# Patient Record
Sex: FEMALE | Race: WHITE | NOT HISPANIC OR LATINO
[De-identification: names, ages, dates, MRNs, and addresses within clinical notes are randomized per-mention and may not be internally consistent; named-entity substitution may affect disease eponyms.]

---

## 2017-04-10 PROBLEM — Z00.00 ENCOUNTER FOR PREVENTIVE HEALTH EXAMINATION: Status: ACTIVE | Noted: 2017-04-10

## 2017-05-02 ENCOUNTER — APPOINTMENT (OUTPATIENT)
Dept: OTOLARYNGOLOGY | Facility: CLINIC | Age: 52
End: 2017-05-02

## 2017-05-02 VITALS
WEIGHT: 130 LBS | HEIGHT: 65 IN | DIASTOLIC BLOOD PRESSURE: 84 MMHG | HEART RATE: 66 BPM | SYSTOLIC BLOOD PRESSURE: 127 MMHG | BODY MASS INDEX: 21.66 KG/M2

## 2017-05-02 DIAGNOSIS — Z78.9 OTHER SPECIFIED HEALTH STATUS: ICD-10-CM

## 2017-05-02 DIAGNOSIS — Z87.09 PERSONAL HISTORY OF OTHER DISEASES OF THE RESPIRATORY SYSTEM: ICD-10-CM

## 2017-05-02 DIAGNOSIS — Z83.3 FAMILY HISTORY OF DIABETES MELLITUS: ICD-10-CM

## 2017-05-02 DIAGNOSIS — Z82.49 FAMILY HISTORY OF ISCHEMIC HEART DISEASE AND OTHER DISEASES OF THE CIRCULATORY SYSTEM: ICD-10-CM

## 2017-05-08 PROBLEM — Z87.09 HISTORY OF ASTHMA: Status: RESOLVED | Noted: 2017-05-02 | Resolved: 2017-05-08

## 2017-05-08 PROBLEM — Z82.49 FAMILY HISTORY OF MYOCARDIAL INFARCTION: Status: ACTIVE | Noted: 2017-05-02

## 2017-05-08 PROBLEM — Z83.3 FAMILY HISTORY OF DIABETES MELLITUS: Status: ACTIVE | Noted: 2017-05-02

## 2017-05-08 RX ORDER — ESCITALOPRAM OXALATE 10 MG/1
10 TABLET, FILM COATED ORAL
Refills: 0 | Status: ACTIVE | COMMUNITY

## 2017-06-09 ENCOUNTER — APPOINTMENT (OUTPATIENT)
Dept: OTOLARYNGOLOGY | Facility: CLINIC | Age: 52
End: 2017-06-09

## 2017-06-09 ENCOUNTER — LABORATORY RESULT (OUTPATIENT)
Age: 52
End: 2017-06-09

## 2017-06-09 VITALS
HEIGHT: 65 IN | SYSTOLIC BLOOD PRESSURE: 113 MMHG | WEIGHT: 130 LBS | DIASTOLIC BLOOD PRESSURE: 79 MMHG | BODY MASS INDEX: 21.66 KG/M2 | HEART RATE: 64 BPM

## 2017-06-30 ENCOUNTER — APPOINTMENT (OUTPATIENT)
Dept: OTOLARYNGOLOGY | Facility: CLINIC | Age: 52
End: 2017-06-30

## 2017-06-30 VITALS
HEIGHT: 65 IN | SYSTOLIC BLOOD PRESSURE: 100 MMHG | HEART RATE: 87 BPM | BODY MASS INDEX: 21.66 KG/M2 | WEIGHT: 130 LBS | DIASTOLIC BLOOD PRESSURE: 73 MMHG

## 2017-06-30 DIAGNOSIS — R09.82 POSTNASAL DRIP: ICD-10-CM

## 2017-08-01 ENCOUNTER — APPOINTMENT (OUTPATIENT)
Dept: OTOLARYNGOLOGY | Facility: CLINIC | Age: 52
End: 2017-08-01

## 2017-08-29 ENCOUNTER — APPOINTMENT (OUTPATIENT)
Dept: OTOLARYNGOLOGY | Facility: CLINIC | Age: 52
End: 2017-08-29

## 2018-07-27 PROBLEM — Z78.9 ALCOHOL USE: Status: ACTIVE | Noted: 2017-05-02

## 2019-03-18 ENCOUNTER — APPOINTMENT (OUTPATIENT)
Dept: OTOLARYNGOLOGY | Facility: CLINIC | Age: 54
End: 2019-03-18
Payer: MEDICAID

## 2019-03-18 VITALS
DIASTOLIC BLOOD PRESSURE: 63 MMHG | SYSTOLIC BLOOD PRESSURE: 103 MMHG | HEART RATE: 60 BPM | WEIGHT: 130 LBS | BODY MASS INDEX: 21.66 KG/M2 | HEIGHT: 65 IN

## 2019-03-18 PROCEDURE — 99214 OFFICE O/P EST MOD 30 MIN: CPT | Mod: 25

## 2019-03-18 PROCEDURE — 31231 NASAL ENDOSCOPY DX: CPT

## 2019-03-18 NOTE — DATA REVIEWED
[de-identified] : CT scan 4/28/17:\par mucosal thickening within b/l L>R frontal sinuses w obstruction of left outflow tract. s/p b/l sphenoethmoidectomies w thickening of L>R maxillary mucosa and remnant ethmoid cells along the orbit.\par This study was markedly improved from CT 4/4/17 which essentially showed pansinusitis and opacified sinuses; most recent scan shows some mucosal thickening and edema, but aerated sinuses.\par

## 2019-03-18 NOTE — PROCEDURE
[FreeTextEntry3] : Nasal Endoscopy:\par diffuse sinonasal mucosal boggy edema and copious thick clear/white mucus suctioned\par R NC: s/p subtotal middle turbinectomy w nubbin of edematous middle turbinate sitting adjacent to posterior fontanelle w thick mucus suctioned; polypoid edematous mucosa obstructing the middle meatus and all other paranasal sinuses\par L NC: middle turbinate medialized and middle meatus obstructed by severe polyposis. posterior fontanelle patent and suctioned of thick mucoid debris/mucopus\par

## 2019-03-18 NOTE — PHYSICAL EXAM
[Nasal Endoscopy Performed] : nasal endoscopy was performed, see procedure section for findings [Midline] : trachea located in midline position [Normal] : orientation to person, place, and time: normal

## 2019-03-18 NOTE — HISTORY OF PRESENT ILLNESS
[de-identified] : 54F here in followup.\par \par She was last seen 6/2017.\par Since last visit, she has done incredibly well and has only needed oral steroids once in over 18 months.\par Shortly after last visit, she was started on Fasenra and her asthma has been very well controlled. Breathing has been strong and without wheezing. Since she was doing so well, she stopped the qnasl, singulair and immunotherapy.\par However, over the past 3 months or so, she developed fatigue and exhaustion, thick green nasal mucus and mild facial pressure. These sx persisted for a few weeks and was given cefdinir which did not help. CT sinus done after abx showed severe pansinusitis. Thereafter, she was given 5days prednisone with augmentin; she has now finished the steroids and is prison through the abx and is here in followup. While on the prednisone, she immediately felt an improvement in her sx.\par As baseline, she has chronic postnasal drip, itchy/watery eyes and nose, sneezing.\par \par She has h/o chronic sinusitis with polyposis with frequent exacerbations requiring multiple courses of abx/steroids yearly.\par She had 2 prior ESS (2014, 2015). She did well ~1yr since last surgery, but sx then restarted - dry cough, wheezing, with occasional forehead pressure and drainage. \par Has lots of allergies (dogs, cats, dust).\par \par CT Sinus 3/8/19:\par near complete pansinus opacification\par \par ROS otherwise unchanged.

## 2019-03-18 NOTE — CONSULT LETTER
[Dear  ___] : Dear  [unfilled], [Courtesy Letter:] : I had the pleasure of seeing your patient, [unfilled], in my office today. [Consult Closing:] : Thank you very much for allowing me to participate in the care of this patient.  If you have any questions, please do not hesitate to contact me. [Sincerely,] : Sincerely, [Benji Mixon MD] : Benji Mixon MD  [DrJose Cruz  ___] : Dr. MEJIAS [Department of Otolaryngology, Head and Neck Surgery] : Department of Otolaryngology, Head and Neck Surgery [[facility]] : [facility]

## 2019-03-18 NOTE — ASSESSMENT
[FreeTextEntry1] : 54F here in followup. Since last visit 6/2017, she has done incredibly well and has only needed oral steroids once in over 18 months. Shortly after last visit, she was started on Fasenra and her asthma has been very well controlled. Breathing has been strong and without wheezing. Since she was doing so well, she stopped the singulair and immunotherapy; she stopped qnasl since insurance does not cover anymore. However, over the past 3 months or so, she developed fatigue and exhaustion, thick green nasal mucus and mild facial pressure. These sx persisted for a few weeks and she was given cefdinir which did not help. CT sinus done after abx showed severe pansinusitis with near complete pansinus opacification. Thereafter, she was given 5days prednisone with augmentin; she has now finished the steroids and is [facility] through the abx and is here in followup. While on the prednisone, she immediately felt an improvement in her sx, but sx persist. As baseline, she has chronic postnasal drip, itchy/watery eyes and nose, sneezing.\par She has h/o chronic sinusitis with polyposis and prior to Fasenra, her asthma was brittle with frequent sinusitis exacerbations requiring multiple courses of abx/steroids yearly.\par She had 2 prior ESS (2014, 2015). She did well ~1yr since last surgery, but sx then restarted - dry cough, wheezing, with occasional forehead pressure and drainage. Has lots of allergies (dogs, cats, dust).\par On exam, nasal endoscopy shows paranasal sinus obstruction due to polypoid edema and polyps with copious thick mucus.\par Although her asthma has improved remarkably on the Fasenra, her polyps have returned and remain symptomatic despite two courses abx and prednisone. Endoscopy shows a large polyp burden which correlates with the CT showing near complete pansinus opacification.\par At this point, would recommend finishing the current round of abx and getting CT after; she RTO thereafter to review. Should CT and endoscopy and sx not improve by much, I would recommend revision sinus surgery to reestablish middle meatal/paranasal sinus patency and maximize efficiency of steroid irrigations. This was all discussed at length and all questions answered.

## 2019-04-05 ENCOUNTER — APPOINTMENT (OUTPATIENT)
Dept: OTOLARYNGOLOGY | Facility: CLINIC | Age: 54
End: 2019-04-05
Payer: MEDICAID

## 2019-04-05 VITALS
SYSTOLIC BLOOD PRESSURE: 114 MMHG | WEIGHT: 130 LBS | HEART RATE: 72 BPM | DIASTOLIC BLOOD PRESSURE: 68 MMHG | HEIGHT: 65 IN | BODY MASS INDEX: 21.66 KG/M2

## 2019-04-05 PROCEDURE — 31231 NASAL ENDOSCOPY DX: CPT

## 2019-04-05 PROCEDURE — 99214 OFFICE O/P EST MOD 30 MIN: CPT | Mod: 25

## 2019-04-07 NOTE — CONSULT LETTER
[Dear  ___] : Dear  [unfilled], [Courtesy Letter:] : I had the pleasure of seeing your patient, [unfilled], in my office today. [Consult Closing:] : Thank you very much for allowing me to participate in the care of this patient.  If you have any questions, please do not hesitate to contact me. [Sincerely,] : Sincerely, [DrJose Cruz  ___] : Dr. MEJIAS [Benji Mixon MD] : Benji Mixon MD  [Department of Otolaryngology, Head and Neck Surgery] : Department of Otolaryngology, Head and Neck Surgery [[facility]] : [facility]

## 2019-04-07 NOTE — DATA REVIEWED
[de-identified] : CT scan 4/28/17:\par mucosal thickening within b/l L>R frontal sinuses w obstruction of left outflow tract. s/p b/l sphenoethmoidectomies w thickening of L>R maxillary mucosa and remnant ethmoid cells along the orbit.\par This study was markedly improved from CT 4/4/17 which essentially showed pansinusitis and opacified sinuses; most recent scan shows some mucosal thickening and edema, but aerated sinuses.\par

## 2019-04-07 NOTE — HISTORY OF PRESENT ILLNESS
[de-identified] : 54F here in followup.\par \par Since last seen 3 weeks ago, she has finished the course of antibiotic and additional steroids. However, she still feels fatigued with diminished energy levels.  The thick green nasal mucus and mild facial pressure have mostly resolved.\par Repeat CT sinus 3/27/19 is improved, but still shows moderately severe bilateral maxillary and frontal disease with near complete opacification of the left side. Initial CT 3/6/19 shows complete pansinus opacification.\par She still has additional steroids which she has not taken due to miscommunication.\par \par Prior to last visit 3 weeks ago, she had done incredibly well over the past 18 months. During this time, she was started on Fasenra and her asthma has been very well controlled. Breathing has been strong and without wheezing. Since she was doing so well, she stopped the qnasl, singulair and immunotherapy.\par \par As baseline, she has chronic postnasal drip, itchy/watery eyes and nose, sneezing.\par \par She has h/o chronic sinusitis with polyposis.\par She had 2 prior ESS (2014, 2015). \par Has lots of allergies (dogs, cats, dust).\par \par ROS otherwise unchanged.

## 2019-04-07 NOTE — ASSESSMENT
[FreeTextEntry1] : 54F here in followup. Since last seen 3 weeks ago, she has finished the course of antibiotic and additional steroids and the thick green nasal mucus and mild facial pressure have mostly resolved.. However, she still feels fatigued with diminished energy levels. Repeat CT sinus 3/27/19 is improved, but still shows moderately severe bilateral maxillary and frontal disease with near complete opacification of the left side. Initial CT 3/6/19 had shown complete pansinus opacification.She still has additional steroids which she has not taken due to miscommunication.\par Prior to last visit 3 weeks ago, she had done incredibly well over the past 18 months. During this time, she was started on Fasenra and her asthma has been very well controlled. Breathing has been strong and without wheezing. Since she was doing so well, she stopped the qnasl, singulair and immunotherapy.\par She has h/o chronic sinusitis with polyposis and prior to Fasenra, her asthma was brittle with frequent sinusitis exacerbations requiring multiple courses of abx/steroids yearly. She had 2 prior ESS (2014, 2015). Has lots of allergies (dogs, cats, dust).\par On exam, nasal endoscopy is improved but still shows mucosal edema and paranasal sinus obstruction due to polypoid edema and polyps.\par Although her asthma has improved remarkably on the Fasenra, her polyps have returned and she remains symptomatic despite several courses abx and prednisone. Endoscopy today is improved, but still shows a moderate polyp burden which correlates with the repeat CT. No doubt she would benefit from revision sinus surgery, but she is improving slowly after most recent steroid course and has also done remarkably well on the Fasenra. Most importantly, she is not yet ready for a third surgery at this point.\par At this point, she will finish the remaining oral steroid taper and RTO thereafter. Should she remain symptomatic then, I would recommend revision sinus surgery to reestablish middle meatal/paranasal sinus patency and maximize efficiency of steroid irrigations. This was all discussed at length and all questions answered.\par RTO 3-4 weeks.

## 2019-05-03 ENCOUNTER — APPOINTMENT (OUTPATIENT)
Dept: OTOLARYNGOLOGY | Facility: CLINIC | Age: 54
End: 2019-05-03

## 2020-10-30 ENCOUNTER — RESULT REVIEW (OUTPATIENT)
Age: 55
End: 2020-10-30

## 2023-10-19 ENCOUNTER — APPOINTMENT (OUTPATIENT)
Dept: OTOLARYNGOLOGY | Facility: CLINIC | Age: 58
End: 2023-10-19
Payer: MEDICAID

## 2023-10-19 VITALS — BODY MASS INDEX: 22.49 KG/M2 | WEIGHT: 135 LBS | HEIGHT: 65 IN

## 2023-10-19 PROCEDURE — 31231 NASAL ENDOSCOPY DX: CPT

## 2023-10-19 PROCEDURE — 99213 OFFICE O/P EST LOW 20 MIN: CPT | Mod: 25

## 2023-10-23 ENCOUNTER — TRANSCRIPTION ENCOUNTER (OUTPATIENT)
Age: 58
End: 2023-10-23

## 2023-12-07 ENCOUNTER — APPOINTMENT (OUTPATIENT)
Dept: PULMONOLOGY | Facility: CLINIC | Age: 58
End: 2023-12-07
Payer: MEDICAID

## 2023-12-07 ENCOUNTER — LABORATORY RESULT (OUTPATIENT)
Age: 58
End: 2023-12-07

## 2023-12-07 VITALS
HEIGHT: 65 IN | WEIGHT: 137 LBS | TEMPERATURE: 97.9 F | HEART RATE: 77 BPM | DIASTOLIC BLOOD PRESSURE: 76 MMHG | OXYGEN SATURATION: 97 % | SYSTOLIC BLOOD PRESSURE: 119 MMHG | BODY MASS INDEX: 22.82 KG/M2

## 2023-12-07 DIAGNOSIS — Z23 ENCOUNTER FOR IMMUNIZATION: ICD-10-CM

## 2023-12-07 PROCEDURE — 94010 BREATHING CAPACITY TEST: CPT | Mod: GC

## 2023-12-07 PROCEDURE — 99204 OFFICE O/P NEW MOD 45 MIN: CPT | Mod: 25,GC

## 2023-12-07 PROCEDURE — 95012 NITRIC OXIDE EXP GAS DETER: CPT | Mod: GC

## 2023-12-07 PROCEDURE — 90677 PCV20 VACCINE IM: CPT

## 2023-12-07 PROCEDURE — G0009: CPT

## 2023-12-07 RX ORDER — PREDNISONE 50 MG/1
50 TABLET ORAL DAILY
Qty: 6 | Refills: 0 | Status: DISCONTINUED | COMMUNITY
Start: 2017-06-09 | End: 2023-12-07

## 2023-12-07 RX ORDER — PREDNISONE 10 MG/1
10 TABLET ORAL
Qty: 28 | Refills: 0 | Status: DISCONTINUED | COMMUNITY
Start: 2023-10-19 | End: 2023-12-07

## 2023-12-07 RX ORDER — CEFDINIR 300 MG/1
300 CAPSULE ORAL
Qty: 14 | Refills: 0 | Status: DISCONTINUED | COMMUNITY
Start: 2016-12-15 | End: 2023-12-07

## 2023-12-07 RX ORDER — SULFAMETHOXAZOLE AND TRIMETHOPRIM 800; 160 MG/1; MG/1
800-160 TABLET ORAL TWICE DAILY
Qty: 20 | Refills: 0 | Status: DISCONTINUED | COMMUNITY
Start: 2017-06-09 | End: 2023-12-07

## 2023-12-07 RX ORDER — PREDNISONE 20 MG/1
20 TABLET ORAL
Qty: 40 | Refills: 0 | Status: DISCONTINUED | COMMUNITY
Start: 2017-04-04 | End: 2023-12-07

## 2023-12-07 RX ORDER — AMOXICILLIN AND CLAVULANATE POTASSIUM 875; 125 MG/1; MG/1
875-125 TABLET, COATED ORAL
Qty: 20 | Refills: 0 | Status: DISCONTINUED | COMMUNITY
Start: 2017-03-24 | End: 2023-12-07

## 2023-12-19 ENCOUNTER — LABORATORY RESULT (OUTPATIENT)
Age: 58
End: 2023-12-19

## 2023-12-22 LAB
BASOPHILS # BLD AUTO: 0.09 K/UL
BASOPHILS NFR BLD AUTO: 0.5 %
EOSINOPHIL # BLD AUTO: 10.09 K/UL
EOSINOPHIL NFR BLD AUTO: 54.7 %
HCT VFR BLD CALC: 35.2 %
HGB BLD-MCNC: 11.3 G/DL
IMM GRANULOCYTES NFR BLD AUTO: 0.2 %
LYMPHOCYTES # BLD AUTO: 2.37 K/UL
LYMPHOCYTES NFR BLD AUTO: 12.9 %
MAN DIFF?: NORMAL
MCHC RBC-ENTMCNC: 32.1 GM/DL
MCHC RBC-ENTMCNC: 32.3 PG
MCV RBC AUTO: 100.6 FL
MONOCYTES # BLD AUTO: 0.7 K/UL
MONOCYTES NFR BLD AUTO: 3.8 %
NEUTROPHILS # BLD AUTO: 5.14 K/UL
NEUTROPHILS NFR BLD AUTO: 27.9 %
PLATELET # BLD AUTO: 345 K/UL
RBC # BLD: 3.5 M/UL
RBC # FLD: 14 %
WBC # FLD AUTO: 18.43 K/UL

## 2023-12-22 NOTE — REVIEW OF SYSTEMS
[Fever] : no fever [Chills] : no chills [Dry Eyes] : no dry eyes [Eye Irritation] : no eye irritation [Sputum] : no sputum [Dyspnea] : no dyspnea [SOB on Exertion] : no sob on exertion [Chest Discomfort] : no chest discomfort [Orthopnea] : no orthopnea [GERD] : no gerd

## 2023-12-22 NOTE — ASSESSMENT
[FreeTextEntry1] : Data reviewed:  Dixon 12/07/2023 : normal / FENO 43 (albuterol prior)  Impression: Chronic sinusitis History of asthma, not in exacerbation  ARISCAT score 3, low risk for periop pulmonary complications.  Plan: - PCV 20 today - counseled on trelegy adherence, continue trelegy in periop period - obtain basic labs BMP CBC, IgE - no pulmonary contraindications to sinus surgery - follow up after surgery for full PFTs, advised to avoid albuterol prior to PFT, may be able to deescalate trelegy at next visit -- Attending Addendum  Seen and examined by me and agree w above. Stated hx asthma since childhood, severe sinus disease, in no exacerbation today on Dupixent and not on her controllers, but with frequent need for albuterol. Counseled her to get back on Trelegy daily through surgical period, and then follow up with us for full lung function testing and deescalation of therapy as appropriate. [id number] given, labs sent. -- Eos 7700 - spoke to pt, repeat CBC/diff and get ANCA, CT chest, query EGPA. Will help her get gen med clearance for surgery. -- Eos 10k/ul, p-ANCA and c-ANCA neg - I did the PA for the CT and pls see Dr Verde to exclude primary heme disorder

## 2023-12-28 ENCOUNTER — OUTPATIENT (OUTPATIENT)
Dept: OUTPATIENT SERVICES | Facility: HOSPITAL | Age: 58
LOS: 1 days | End: 2023-12-28
Payer: MEDICAID

## 2023-12-28 ENCOUNTER — RESULT REVIEW (OUTPATIENT)
Age: 58
End: 2023-12-28

## 2023-12-28 ENCOUNTER — APPOINTMENT (OUTPATIENT)
Dept: CT IMAGING | Facility: HOSPITAL | Age: 58
End: 2023-12-28

## 2023-12-28 PROCEDURE — 71250 CT THORAX DX C-: CPT | Mod: 26

## 2023-12-28 PROCEDURE — 71250 CT THORAX DX C-: CPT

## 2024-01-04 ENCOUNTER — APPOINTMENT (OUTPATIENT)
Dept: INTERNAL MEDICINE | Facility: CLINIC | Age: 59
End: 2024-01-04
Payer: MEDICAID

## 2024-01-04 VITALS
HEART RATE: 89 BPM | OXYGEN SATURATION: 97 % | DIASTOLIC BLOOD PRESSURE: 71 MMHG | WEIGHT: 129 LBS | TEMPERATURE: 97.1 F | SYSTOLIC BLOOD PRESSURE: 108 MMHG | HEIGHT: 65 IN | BODY MASS INDEX: 21.49 KG/M2

## 2024-01-04 PROCEDURE — 36415 COLL VENOUS BLD VENIPUNCTURE: CPT

## 2024-01-04 PROCEDURE — 99213 OFFICE O/P EST LOW 20 MIN: CPT | Mod: 25

## 2024-01-04 PROCEDURE — 93000 ELECTROCARDIOGRAM COMPLETE: CPT

## 2024-01-08 ENCOUNTER — LABORATORY RESULT (OUTPATIENT)
Age: 59
End: 2024-01-08

## 2024-01-08 ENCOUNTER — APPOINTMENT (OUTPATIENT)
Dept: HEMATOLOGY ONCOLOGY | Facility: CLINIC | Age: 59
End: 2024-01-08
Payer: MEDICAID

## 2024-01-08 VITALS
SYSTOLIC BLOOD PRESSURE: 103 MMHG | HEIGHT: 65 IN | TEMPERATURE: 98.2 F | DIASTOLIC BLOOD PRESSURE: 68 MMHG | RESPIRATION RATE: 18 BRPM | WEIGHT: 130 LBS | OXYGEN SATURATION: 96 % | HEART RATE: 83 BPM | BODY MASS INDEX: 21.66 KG/M2

## 2024-01-08 PROCEDURE — 99204 OFFICE O/P NEW MOD 45 MIN: CPT

## 2024-01-08 NOTE — ASSESSMENT
[FreeTextEntry1] : 58-year-old female with history of asthma and nasal polyps here for initial evaluation of incidentally found eosinophilia.  All patients with AEC 1500/microL should have a CBC repeated in one to two weeks to determine if the eosinophilia is transient, stable, or rising; the CBC should be repeated even when eosinophilia is detected incidentally in an asymptomatic patient. Persistent AEC >1500/microL or a rising AEC should be evaluated promptly for HES, even though it is uncommon for such patients to be completely asymptomatic.   Work up: CBC with diff CMP HTLV I, HTLV II Hepatitis HIV Serum tryptase (most consistently elevated in myeloproliferative variants [M-HES], variable in other subtypes) Serum vitamin B12 (elevated in M-HES) Serum immunoglobulins (particularly immunoglobulin E [IgE])  Flow cytometry PDGFRA & PDGFRB  RTC on 1/22/24 via

## 2024-01-08 NOTE — HISTORY OF PRESENT ILLNESS
[de-identified] : Nati Shipley is a 58-year-old female who presents to the clinic for initial consultation of eosinophilia.  Heme hx: 12/2023: Has been having night sweats and chills. She has been recently thinking here for a long time of her mother who just passed away.  PMH: asthma, nasal polyps (s/p 2 surgeries) PSH: Nasal polypectomy Allergies: Anaphylaxis to Avelox SH: none  Recently started on Trelegy.     Oriented - self; Oriented - place; Oriented - time

## 2024-01-09 ENCOUNTER — APPOINTMENT (OUTPATIENT)
Dept: INTERNAL MEDICINE | Facility: CLINIC | Age: 59
End: 2024-01-09
Payer: MEDICAID

## 2024-01-09 LAB
APTT BLD: 32.7 SEC
CHOLEST SERPL-MCNC: 135 MG/DL
ESTIMATED AVERAGE GLUCOSE: 123 MG/DL
HBA1C MFR BLD HPLC: 5.9 %
HDLC SERPL-MCNC: 34 MG/DL
INR PPP: 1.05 RATIO
LDLC SERPL CALC-MCNC: 85 MG/DL
NONHDLC SERPL-MCNC: 101 MG/DL
PT BLD: 11.9 SEC
TRIGL SERPL-MCNC: 78 MG/DL
TSH SERPL-ACNC: 2.81 UIU/ML

## 2024-01-09 PROCEDURE — 99214 OFFICE O/P EST MOD 30 MIN: CPT

## 2024-01-09 RX ORDER — BECLOMETHASONE DIPROPIONATE 80 UG/1
80 AEROSOL, METERED RESPIRATORY (INHALATION)
Qty: 9 | Refills: 0 | Status: DISCONTINUED | COMMUNITY
Start: 2017-01-24 | End: 2024-01-09

## 2024-01-09 RX ORDER — ESCITALOPRAM OXALATE 20 MG/1
20 TABLET ORAL
Qty: 30 | Refills: 0 | Status: DISCONTINUED | COMMUNITY
Start: 2015-12-28 | End: 2024-01-09

## 2024-01-09 RX ORDER — BECLOMETHASONE DIPROPIONATE 80 UG/1
AEROSOL, METERED NASAL
Refills: 0 | Status: DISCONTINUED | COMMUNITY
End: 2024-01-09

## 2024-01-09 RX ORDER — BUPROPION HYDROCHLORIDE 200 MG/1
200 TABLET, FILM COATED ORAL
Refills: 0 | Status: DISCONTINUED | COMMUNITY
End: 2024-01-09

## 2024-01-09 RX ORDER — MONTELUKAST 10 MG/1
10 TABLET, FILM COATED ORAL DAILY
Qty: 30 | Refills: 3 | Status: ACTIVE | COMMUNITY
Start: 1900-01-01 | End: 1900-01-01

## 2024-01-09 RX ORDER — FLUTICASONE PROPIONATE 50 UG/1
50 SPRAY, METERED NASAL
Qty: 16 | Refills: 0 | Status: DISCONTINUED | COMMUNITY
Start: 2017-01-31 | End: 2024-01-09

## 2024-01-10 LAB
ALBUMIN SERPL ELPH-MCNC: 3.2 G/DL
ALP BLD-CCNC: 119 U/L
ALT SERPL-CCNC: 17 U/L
ANION GAP SERPL CALC-SCNC: 4 MMOL/L
AST SERPL-CCNC: 22 U/L
BILIRUB SERPL-MCNC: 0.5 MG/DL
BUN SERPL-MCNC: 7 MG/DL
CALCIUM SERPL-MCNC: 9.9 MG/DL
CHLORIDE SERPL-SCNC: 104 MMOL/L
CO2 SERPL-SCNC: 31 MMOL/L
CREAT SERPL-MCNC: 0.6 MG/DL
EGFR: 103 ML/MIN/1.73M2
FOLATE SERPL-MCNC: 5 NG/ML
GLUCOSE SERPL-MCNC: 103 MG/DL
HBV CORE IGG+IGM SER QL: NONREACTIVE
HBV SURFACE AB SER QL: NONREACTIVE
HBV SURFACE AG SER QL: NONREACTIVE
HCV AB SER QL: NONREACTIVE
HCV S/CO RATIO: 0.03 S/CO
HIV1+2 AB SPEC QL IA.RAPID: NONREACTIVE
POTASSIUM SERPL-SCNC: 4.6 MMOL/L
PROT SERPL-MCNC: 8.3 G/DL
SODIUM SERPL-SCNC: 139 MMOL/L
TRYPTASE: 2.6 UG/L
VIT B12 SERPL-MCNC: 540 PG/ML

## 2024-01-12 ENCOUNTER — LABORATORY RESULT (OUTPATIENT)
Age: 59
End: 2024-01-12

## 2024-01-12 ENCOUNTER — APPOINTMENT (OUTPATIENT)
Dept: PULMONOLOGY | Facility: CLINIC | Age: 59
End: 2024-01-12
Payer: MEDICAID

## 2024-01-12 VITALS
HEART RATE: 86 BPM | OXYGEN SATURATION: 97 % | BODY MASS INDEX: 20.83 KG/M2 | DIASTOLIC BLOOD PRESSURE: 67 MMHG | WEIGHT: 125 LBS | TEMPERATURE: 97.6 F | SYSTOLIC BLOOD PRESSURE: 90 MMHG | HEIGHT: 65 IN

## 2024-01-12 DIAGNOSIS — R31.29 OTHER MICROSCOPIC HEMATURIA: ICD-10-CM

## 2024-01-12 LAB
APPEARANCE: CLEAR
APTT BLD: 33.3 SEC
BILIRUBIN URINE: NEGATIVE
BLOOD URINE: NEGATIVE
COLOR: YELLOW
GLUCOSE QUALITATIVE U: NEGATIVE MG/DL
HTLV I+II AB SER QL: NORMAL
INR PPP: 1.14
KETONES URINE: ABNORMAL MG/DL
LEUKOCYTE ESTERASE URINE: ABNORMAL
NITRITE URINE: NEGATIVE
NT-PROBNP SERPL-MCNC: 85 PG/ML
PH URINE: 6.5
PROTEIN URINE: NORMAL MG/DL
PT BLD: 13 SEC
SPECIFIC GRAVITY URINE: 1.02
UROBILINOGEN URINE: 1 MG/DL

## 2024-01-12 PROCEDURE — 99214 OFFICE O/P EST MOD 30 MIN: CPT | Mod: 25

## 2024-01-12 PROCEDURE — 36415 COLL VENOUS BLD VENIPUNCTURE: CPT

## 2024-01-16 ENCOUNTER — LABORATORY RESULT (OUTPATIENT)
Age: 59
End: 2024-01-16

## 2024-01-16 ENCOUNTER — NON-APPOINTMENT (OUTPATIENT)
Age: 59
End: 2024-01-16

## 2024-01-16 LAB — TOTAL IGE SMQN RAST: 189 KU/L

## 2024-01-17 ENCOUNTER — APPOINTMENT (OUTPATIENT)
Dept: HEMATOLOGY ONCOLOGY | Facility: CLINIC | Age: 59
End: 2024-01-17

## 2024-01-17 ENCOUNTER — LABORATORY RESULT (OUTPATIENT)
Age: 59
End: 2024-01-17

## 2024-01-17 LAB — LDH SERPL-CCNC: <10 U/L

## 2024-01-17 NOTE — ASSESSMENT
[FreeTextEntry1] : 58F PMHx chronic sinusitis, s/p ESS 2014 and 2015, asthma, presenting for follow up from preop visit to be seen by supervising attending; discussed hematological lab abnormalities.

## 2024-01-17 NOTE — HISTORY OF PRESENT ILLNESS
[FreeTextEntry1] : follow up  [de-identified] : 58F PMHx chronic sinusitis, s/p ESS 2014 and 2015, asthma, presenting for follow up. Patient was seen 1/4 for pre-op visit for planned endoscopic sinus surgery on 1/29/24. Reviewed labs with patient and noted persistently elevated WBC and eosinophils and decreasing Hgb. Patient notes having nightly chills, sweats, poor appetite, recurring HA, and roughly ~10lbs weight loss since 12/23. Denies any fatigue, fevers, cough, vision changes, CP, SOB, n/v/d, melena,  sxs, skin change, joint pain. No sick contacts. Last traveled to [place], [place] in Sept for 1 week; no acute illnesses at the time. UTD with routine cancer screenings. Patient was seen by Laura yesterday and is being worked up for eosinophilia.   Cleared medically for ESS on 1/29/24.

## 2024-01-17 NOTE — REVIEW OF SYSTEMS
[Chills] : chills [Night Sweats] : night sweats [Recent Change In Weight] : ~T recent weight change [Fever] : no fever [Fatigue] : no fatigue [Hot Flashes] : no hot flashes [Discharge] : no discharge [Vision Problems] : no vision problems [Earache] : no earache [Sore Throat] : no sore throat [Chest Pain] : no chest pain [Shortness Of Breath] : no shortness of breath [Wheezing] : no wheezing [Cough] : no cough [Dyspnea on Exertion] : no dyspnea on exertion [Abdominal Pain] : no abdominal pain [Nausea] : no nausea [Constipation] : no constipation [Diarrhea] : diarrhea [Vomiting] : no vomiting [Melena] : no melena [Dysuria] : no dysuria [Hematuria] : no hematuria [Frequency] : no frequency [Joint Pain] : no joint pain [Muscle Weakness] : no muscle weakness [Muscle Pain] : no muscle pain [Itching] : no itching [Skin Rash] : no skin rash [Headache] : no headache [Dizziness] : no dizziness [Insomnia] : no insomnia [Easy Bruising] : no easy bruising

## 2024-01-17 NOTE — PHYSICAL EXAM
[No Acute Distress] : no acute distress [Well Nourished] : well nourished [PERRL] : pupils equal round and reactive to light [EOMI] : extraocular movements intact [Normal Outer Ear/Nose] : the outer ears and nose were normal in appearance [No Lymphadenopathy] : no lymphadenopathy [No Accessory Muscle Use] : no accessory muscle use [Clear to Auscultation] : lungs were clear to auscultation bilaterally [Normal Rate] : normal rate  [Normal S1, S2] : normal S1 and S2 [No Edema] : there was no peripheral edema [No Extremity Clubbing/Cyanosis] : no extremity clubbing/cyanosis [Soft] : abdomen soft [Non Tender] : non-tender [Non-distended] : non-distended [No Masses] : no abdominal mass palpated [Normal Bowel Sounds] : normal bowel sounds [No Joint Swelling] : no joint swelling [No Rash] : no rash [No Focal Deficits] : no focal deficits [de-identified] : R inner buccal swelling

## 2024-01-17 NOTE — PLAN
[FreeTextEntry1] : .  #eosinophilia #leukocytosis #anemia #thrombophilia  #B-symptoms - Found to have persistently elevated WBC, eosinophils since 12/7 labs.  - Reports nightly chills, sweats, poor appetite, recurring HA, and roughly ~10lbs weight loss since 12/23 - No personal or FMHx malignancy; UTD with cancer screenings - No s/s anemia; denies epistaxis, melena, hematemesis, bruising  - Physical exam unremarkable, no lymphadenopathy - Possibly medication side effect of Dupixent; no longer taking prednisone  - Was seen by Laura on 1/8; will continue hematological w/u - f/u stool Ova and Parasite   #asthma - c/w Montelukast 10mg qd - c/w Trelegy inh qd   #anxiety  - c/w lexapro 10mg qd   #HCM - UTD with mammo, pap smear - sent referral for C-scope

## 2024-01-18 ENCOUNTER — OUTPATIENT (OUTPATIENT)
Dept: OUTPATIENT SERVICES | Facility: HOSPITAL | Age: 59
LOS: 1 days | End: 2024-01-18
Payer: MEDICAID

## 2024-01-18 DIAGNOSIS — D72.10 EOSINOPHILIA, UNSPECIFIED: ICD-10-CM

## 2024-01-18 PROBLEM — R31.29 MICROSCOPIC HEMATURIA: Status: ACTIVE | Noted: 2024-01-18

## 2024-01-18 LAB
APPEARANCE: CLEAR
BILIRUBIN URINE: NEGATIVE
BLOOD URINE: NEGATIVE
COLOR: YELLOW
CREAT SPEC-SCNC: 120 MG/DL
GLUCOSE QUALITATIVE U: NEGATIVE MG/DL
KETONES URINE: NEGATIVE MG/DL
LEUKOCYTE ESTERASE URINE: ABNORMAL
MICROALBUMIN 24H UR DL<=1MG/L-MCNC: <1.2 MG/DL
MICROALBUMIN/CREAT 24H UR-RTO: NORMAL MG/G
MYELOPEROXIDASE AB SER QL IA: 6.2 UNITS
MYELOPEROXIDASE CELLS FLD QL: NEGATIVE
NITRITE URINE: NEGATIVE
PH URINE: 6.5
PROTEIN URINE: NEGATIVE MG/DL
PROTEINASE3 AB SER IA-ACNC: <5 UNITS
PROTEINASE3 AB SER-ACNC: NEGATIVE
SPECIFIC GRAVITY URINE: 1.02
UROBILINOGEN URINE: 0.2 MG/DL

## 2024-01-18 PROCEDURE — C8929: CPT

## 2024-01-18 PROCEDURE — 93306 TTE W/DOPPLER COMPLETE: CPT | Mod: 26

## 2024-01-18 NOTE — HISTORY OF PRESENT ILLNESS
[de-identified] : Nati Shipley is a 58-year-old female who presents to the clinic for initial consultation of eosinophilia.  Heme hx: 12/2023: Has been having night sweats and chills. She has been recently thinking here for a long time of her mother who just passed away.  PMH: asthma, nasal polyps (s/p 2 surgeries) PSH: Nasal polypectomy Allergies: Anaphylaxis to Avelox SH: none  Recently started on Trelegy.

## 2024-01-19 ENCOUNTER — RESULT REVIEW (OUTPATIENT)
Age: 59
End: 2024-01-19

## 2024-01-22 ENCOUNTER — APPOINTMENT (OUTPATIENT)
Dept: HEMATOLOGY ONCOLOGY | Facility: CLINIC | Age: 59
End: 2024-01-22

## 2024-01-23 LAB — T(9;22)(ABL1,BCR)/CONTROL BLD/T: NORMAL

## 2024-01-24 ENCOUNTER — APPOINTMENT (OUTPATIENT)
Dept: PULMONOLOGY | Facility: CLINIC | Age: 59
End: 2024-01-24

## 2024-01-25 ENCOUNTER — APPOINTMENT (OUTPATIENT)
Dept: HEMATOLOGY ONCOLOGY | Facility: CLINIC | Age: 59
End: 2024-01-25
Payer: MEDICAID

## 2024-01-25 ENCOUNTER — APPOINTMENT (OUTPATIENT)
Dept: HEMATOLOGY ONCOLOGY | Facility: CLINIC | Age: 59
End: 2024-01-25

## 2024-01-25 PROCEDURE — 99212 OFFICE O/P EST SF 10 MIN: CPT | Mod: 95

## 2024-01-26 NOTE — ASU PATIENT PROFILE, ADULT - NSICDXPASTMEDICALHX_GEN_ALL_CORE_FT
PAST MEDICAL HISTORY:  Asthma     H/O chronic sinusitis      PAST MEDICAL HISTORY:  Asthma well comtrolled    H/O chronic sinusitis

## 2024-01-26 NOTE — ASU PATIENT PROFILE, ADULT - VISION (WITH CORRECTIVE LENSES IF THE PATIENT USUALLY WEARS THEM):
contacts/Partially impaired: cannot see medication labels or newsprint, but can see obstacles in path, and the surrounding layout; can count fingers at arm's length contacts left home/Partially impaired: cannot see medication labels or newsprint, but can see obstacles in path, and the surrounding layout; can count fingers at arm's length

## 2024-01-26 NOTE — ASU PATIENT PROFILE, ADULT - FALL HARM RISK - UNIVERSAL INTERVENTIONS
Bed in lowest position, wheels locked, appropriate side rails in place/Call bell, personal items and telephone in reach/Instruct patient to call for assistance before getting out of bed or chair/Non-slip footwear when patient is out of bed/[place] to call system/Physically safe environment - no spills, clutter or unnecessary equipment/Purposeful Proactive Rounding/Room/bathroom lighting operational, light cord in reach

## 2024-01-28 ENCOUNTER — TRANSCRIPTION ENCOUNTER (OUTPATIENT)
Age: 59
End: 2024-01-28

## 2024-01-29 ENCOUNTER — RESULT REVIEW (OUTPATIENT)
Age: 59
End: 2024-01-29

## 2024-01-29 ENCOUNTER — OUTPATIENT (OUTPATIENT)
Dept: OUTPATIENT SERVICES | Facility: HOSPITAL | Age: 59
LOS: 1 days | Discharge: HOME | End: 2024-01-29
Payer: MEDICAID

## 2024-01-29 ENCOUNTER — APPOINTMENT (OUTPATIENT)
Dept: OTOLARYNGOLOGY | Facility: HOSPITAL | Age: 59
End: 2024-01-29

## 2024-01-29 ENCOUNTER — TRANSCRIPTION ENCOUNTER (OUTPATIENT)
Age: 59
End: 2024-01-29

## 2024-01-29 VITALS
TEMPERATURE: 99 F | OXYGEN SATURATION: 96 % | HEART RATE: 70 BPM | DIASTOLIC BLOOD PRESSURE: 56 MMHG | SYSTOLIC BLOOD PRESSURE: 100 MMHG | RESPIRATION RATE: 16 BRPM

## 2024-01-29 VITALS
SYSTOLIC BLOOD PRESSURE: 93 MMHG | HEART RATE: 55 BPM | OXYGEN SATURATION: 99 % | TEMPERATURE: 99 F | RESPIRATION RATE: 14 BRPM | DIASTOLIC BLOOD PRESSURE: 60 MMHG | HEIGHT: 65 IN | WEIGHT: 125.22 LBS

## 2024-01-29 DIAGNOSIS — Z98.890 OTHER SPECIFIED POSTPROCEDURAL STATES: Chronic | ICD-10-CM

## 2024-01-29 LAB
GRAM STN FLD: ABNORMAL
SPECIMEN SOURCE: SIGNIFICANT CHANGE UP

## 2024-01-29 PROCEDURE — 30130 EXCISE INFERIOR TURBINATE: CPT | Mod: 50

## 2024-01-29 PROCEDURE — 88311 DECALCIFY TISSUE: CPT | Mod: 26

## 2024-01-29 PROCEDURE — 61782 SCAN PROC CRANIAL EXTRA: CPT

## 2024-01-29 PROCEDURE — 88304 TISSUE EXAM BY PATHOLOGIST: CPT | Mod: 26

## 2024-01-29 PROCEDURE — 61580 CRANIOFACIAL APPROACH SKULL: CPT

## 2024-01-29 PROCEDURE — 31267 ENDOSCOPY MAXILLARY SINUS: CPT | Mod: 50

## 2024-01-29 PROCEDURE — 88313 SPECIAL STAINS GROUP 2: CPT | Mod: 26

## 2024-01-29 PROCEDURE — 31259 NSL/SINS NDSC SPHN TISS RMVL: CPT | Mod: 50

## 2024-01-29 PROCEDURE — 88305 TISSUE EXAM BY PATHOLOGIST: CPT | Mod: 26

## 2024-01-29 PROCEDURE — 15769 GRFG AUTOL SOFT TISS DIR EXC: CPT

## 2024-01-29 DEVICE — SURGIFOAM PAD 2CM X 6CM X 7MM (12-7): Type: IMPLANTABLE DEVICE | Status: FUNCTIONAL

## 2024-01-29 DEVICE — STENT DRUG ELUTING SINUS BIO ABSORB INTERSECT ENT PROPEL 23MM: Type: IMPLANTABLE DEVICE | Status: FUNCTIONAL

## 2024-01-29 DEVICE — SHT XOMED 0.010X025: Type: IMPLANTABLE DEVICE | Status: FUNCTIONAL

## 2024-01-29 DEVICE — STENT SINUS DRUG ELUDING PROPEL CONTOUR: Type: IMPLANTABLE DEVICE | Status: FUNCTIONAL

## 2024-01-29 RX ORDER — ACETAMINOPHEN 500 MG
1000 TABLET ORAL ONCE
Refills: 0 | Status: DISCONTINUED | OUTPATIENT
Start: 2024-01-29 | End: 2024-01-29

## 2024-01-29 RX ORDER — FLUTICASONE FUROATE, UMECLIDINIUM BROMIDE AND VILANTEROL TRIFENATATE 200; 62.5; 25 UG/1; UG/1; UG/1
1 POWDER RESPIRATORY (INHALATION)
Refills: 0 | DISCHARGE

## 2024-01-29 RX ORDER — ACETAMINOPHEN 500 MG
1000 TABLET ORAL ONCE
Refills: 0 | Status: COMPLETED | OUTPATIENT
Start: 2024-01-29 | End: 2024-01-29

## 2024-01-29 RX ORDER — FLUTICASONE PROPIONATE 50 MCG
1 SPRAY, SUSPENSION NASAL
Refills: 0 | DISCHARGE

## 2024-01-29 RX ORDER — RACEPINEPHRINE HCL 2.25 %
1 SOLUTION, NON-ORAL TOPICAL ONCE
Refills: 0 | Status: DISCONTINUED | OUTPATIENT
Start: 2024-01-29 | End: 2024-01-29

## 2024-01-29 RX ORDER — MONTELUKAST 4 MG/1
1 TABLET, CHEWABLE ORAL
Refills: 0 | DISCHARGE

## 2024-01-29 RX ORDER — HYDROMORPHONE HYDROCHLORIDE 2 MG/ML
0.5 INJECTION INTRAMUSCULAR; INTRAVENOUS; SUBCUTANEOUS
Refills: 0 | Status: DISCONTINUED | OUTPATIENT
Start: 2024-01-29 | End: 2024-01-29

## 2024-01-29 RX ORDER — ESCITALOPRAM OXALATE 10 MG/1
1 TABLET, FILM COATED ORAL
Refills: 0 | DISCHARGE

## 2024-01-29 RX ORDER — APREPITANT 80 MG/1
40 CAPSULE ORAL ONCE
Refills: 0 | Status: COMPLETED | OUTPATIENT
Start: 2024-01-29 | End: 2024-01-29

## 2024-01-29 RX ORDER — SODIUM CHLORIDE 9 MG/ML
1000 INJECTION, SOLUTION INTRAVENOUS
Refills: 0 | Status: DISCONTINUED | OUTPATIENT
Start: 2024-01-29 | End: 2024-01-29

## 2024-01-29 RX ADMIN — APREPITANT 40 MILLIGRAM(S): 80 CAPSULE ORAL at 06:20

## 2024-01-29 RX ADMIN — SODIUM CHLORIDE 100 MILLILITER(S): 9 INJECTION, SOLUTION INTRAVENOUS at 13:07

## 2024-01-29 RX ADMIN — Medication 1000 MILLIGRAM(S): at 06:20

## 2024-01-29 NOTE — PRE-ANESTHESIA EVALUATION ADULT - NSANTHPMHFT_GEN_ALL_CORE
eosinophilia recently worked up for eosinophilia with concern for EGPA. cardiology, pulmonology, ENT, hematology/oncology notes reviewed.    additional PSH; intramuscular left shoulder mass excision 2015

## 2024-01-29 NOTE — ASU DISCHARGE PLAN (ADULT/PEDIATRIC) - NS MD DC FALL RISK RISK
For information on Fall & Injury Prevention, visit: [email or web address] OR  [email or web address] OR  https://www.cdc.gov/steadi/patient.html

## 2024-01-29 NOTE — ASU DISCHARGE PLAN (ADULT/PEDIATRIC) - CARE PROVIDER_API CALL
Benji Mixon  Otolaryngology  [address] [place] [address], [address]  [place] [address]  Phone: [phone]  Fax: [phone]  Established Patient  Follow Up Time:    Benji Mixon  Otolaryngology  [address] [place] [address], [address]  [place] [address]  Phone: [phone]  Fax: [phone]  Established Patient  Scheduled Appointment: 02/02/2024

## 2024-01-29 NOTE — BRIEF OPERATIVE NOTE - NSICDXBRIEFPREOP_GEN_ALL_CORE_FT
PRE-OP DIAGNOSIS:  Chronic pansinusitis 29-Jan-2024 19:24:14  Benji Mixon  Nasal polyposis 29-Jan-2024 19:24:21  Benji Mixon

## 2024-01-29 NOTE — BRIEF OPERATIVE NOTE - NSICDXBRIEFPROCEDURE_GEN_ALL_CORE_FT
PROCEDURES:  Surgical procedure, frontal sinus, Draf III 29-Jan-2024 19:21:49  Benji Mixon  Antrostomy, maxillary, with tissue removal 29-Jan-2024 19:22:03  Benji Mixon  Endoscopic sphenoidotomy with tissue removal 29-Jan-2024 19:22:31  Benji Mixon  FESS, with 3D imaging guidance 29-Jan-2024 19:23:39  Benji Mixon

## 2024-01-29 NOTE — ASU DISCHARGE PLAN (ADULT/PEDIATRIC) - PROVIDER TOKENS
PROVIDER:[TOKEN:[[id number]],ESTABLISHEDPATIENT:[T]] PROVIDER:[TOKEN:[[id number]],SCHEDULEDAPPT:[02/02/2024],ESTABLISHEDPATIENT:[T]]

## 2024-01-31 ENCOUNTER — OUTPATIENT (OUTPATIENT)
Dept: OUTPATIENT SERVICES | Facility: HOSPITAL | Age: 59
LOS: 1 days | End: 2024-01-31
Payer: MEDICAID

## 2024-01-31 ENCOUNTER — APPOINTMENT (OUTPATIENT)
Dept: ULTRASOUND IMAGING | Facility: HOSPITAL | Age: 59
End: 2024-01-31
Payer: MEDICAID

## 2024-01-31 DIAGNOSIS — Z98.890 OTHER SPECIFIED POSTPROCEDURAL STATES: Chronic | ICD-10-CM

## 2024-01-31 LAB
CULTURE RESULTS: ABNORMAL
SPECIMEN SOURCE: SIGNIFICANT CHANGE UP

## 2024-01-31 PROCEDURE — 76770 US EXAM ABDO BACK WALL COMP: CPT

## 2024-01-31 PROCEDURE — 76770 US EXAM ABDO BACK WALL COMP: CPT | Mod: 26

## 2024-02-01 PROBLEM — J45.909 UNSPECIFIED ASTHMA, UNCOMPLICATED: Chronic | Status: ACTIVE | Noted: 2024-01-26

## 2024-02-01 PROBLEM — Z87.09 PERSONAL HISTORY OF OTHER DISEASES OF THE RESPIRATORY SYSTEM: Chronic | Status: ACTIVE | Noted: 2024-01-26

## 2024-02-05 LAB
ANION GAP SERPL CALC-SCNC: 12 MMOL/L
BASOPHILS # BLD AUTO: 0.5 K/UL
BASOPHILS NFR BLD AUTO: 2.6 %
BUN SERPL-MCNC: 12 MG/DL
CALCIUM SERPL-MCNC: 10.1 MG/DL
CHLORIDE SERPL-SCNC: 100 MMOL/L
CO2 SERPL-SCNC: 26 MMOL/L
CREAT SERPL-MCNC: 0.63 MG/DL
EGFR: 101 ML/MIN/1.73M2
EOSINOPHIL # BLD AUTO: 7.72 K/UL
EOSINOPHIL NFR BLD AUTO: 40 %
GLUCOSE SERPL-MCNC: 102 MG/DL
HCT VFR BLD CALC: 37.3 %
HGB BLD-MCNC: 12.1 G/DL
LYMPHOCYTES # BLD AUTO: 3.03 K/UL
LYMPHOCYTES NFR BLD AUTO: 15.7 %
MAN DIFF?: NORMAL
MCHC RBC-ENTMCNC: 31.3 PG
MCHC RBC-ENTMCNC: 32.4 GM/DL
MCV RBC AUTO: 96.4 FL
MONOCYTES # BLD AUTO: 0.83 K/UL
MONOCYTES NFR BLD AUTO: 4.3 %
NEUTROPHILS # BLD AUTO: 7.21 K/UL
NEUTROPHILS NFR BLD AUTO: 37.4 %
PLATELET # BLD AUTO: 218 K/UL
POTASSIUM SERPL-SCNC: 4.1 MMOL/L
RBC # BLD: 3.87 M/UL
RBC # FLD: 14.3 %
SODIUM SERPL-SCNC: 138 MMOL/L
TOTAL IGE SMQN RAST: 126 KU/L
WBC # FLD AUTO: 19.29 K/UL

## 2024-02-06 ENCOUNTER — APPOINTMENT (OUTPATIENT)
Dept: OTOLARYNGOLOGY | Facility: CLINIC | Age: 59
End: 2024-02-06
Payer: MEDICAID

## 2024-02-06 PROCEDURE — 99024 POSTOP FOLLOW-UP VISIT: CPT

## 2024-02-06 PROCEDURE — 31237 NSL/SINS NDSC SURG BX POLYPC: CPT | Mod: 58

## 2024-02-12 LAB — EAR NOSE AND THROAT CULTURE: ABNORMAL

## 2024-02-22 LAB — SURGICAL PATHOLOGY STUDY: SIGNIFICANT CHANGE UP

## 2024-02-27 ENCOUNTER — APPOINTMENT (OUTPATIENT)
Dept: GASTROENTEROLOGY | Facility: CLINIC | Age: 59
End: 2024-02-27
Payer: MEDICAID

## 2024-02-27 ENCOUNTER — APPOINTMENT (OUTPATIENT)
Dept: OTOLARYNGOLOGY | Facility: CLINIC | Age: 59
End: 2024-02-27
Payer: MEDICAID

## 2024-02-27 VITALS
HEIGHT: 65 IN | BODY MASS INDEX: 20.83 KG/M2 | WEIGHT: 125 LBS | DIASTOLIC BLOOD PRESSURE: 70 MMHG | OXYGEN SATURATION: 97 % | HEART RATE: 76 BPM | TEMPERATURE: 96.6 F | SYSTOLIC BLOOD PRESSURE: 110 MMHG | RESPIRATION RATE: 14 BRPM

## 2024-02-27 VITALS
DIASTOLIC BLOOD PRESSURE: 79 MMHG | TEMPERATURE: 98.2 F | WEIGHT: 125 LBS | SYSTOLIC BLOOD PRESSURE: 114 MMHG | HEIGHT: 65 IN | HEART RATE: 71 BPM | BODY MASS INDEX: 20.83 KG/M2

## 2024-02-27 DIAGNOSIS — D64.9 ANEMIA, UNSPECIFIED: ICD-10-CM

## 2024-02-27 DIAGNOSIS — Z12.11 ENCOUNTER FOR SCREENING FOR MALIGNANT NEOPLASM OF COLON: ICD-10-CM

## 2024-02-27 PROCEDURE — 99204 OFFICE O/P NEW MOD 45 MIN: CPT

## 2024-02-27 PROCEDURE — 99024 POSTOP FOLLOW-UP VISIT: CPT

## 2024-02-27 PROCEDURE — 31237 NSL/SINS NDSC SURG BX POLYPC: CPT | Mod: 58

## 2024-02-27 NOTE — ASSESSMENT
[FreeTextEntry1] : 59F here in postoperative visit s/p revision ESS (draf 3 frontal sinusotomy, ethmoid, sphenoid, maxillary) 1/29/24 for chronic pansinusitis. Intraoperatively, moderately severe frontoethmoid mucosal disease and thickened osteitic bone throughout.  She is doing well since last seen There is no pain. She is doing BID budesonide rinses. Her voice is still a bit hoarse since surgery. On exam, nasal endoscopy shows expected postsurgical changes with parent nasal airways and paranasal sinuses. She is doing very well s/p extensive sinus surgery just 4 weeks prior. For now, continue BID budesonide rinses. Pathology reviewed - does not support EGPA in the nose/paranasal sinuses, rather just eosinophilic sinusitis. RTO 8 weeks.

## 2024-02-27 NOTE — HISTORY OF PRESENT ILLNESS
[FreeTextEntry1] : 59F with a h/o chronic sinusitis s/p ESS 2014 and 2015, asthma, Eosinophilic granulomatosis with polyangiitis (EGPA) who presents for screening colonoscopy consultation. Patient underwent recent endoscopic sinus surgery and has rehabilitated well postoperatively. Had colonoscopy about 10 years ago, had 2 polyps removed. No prior EGD. Denies Gi symptoms at this time, including abd pain, n/v, heartburn, diarrhea, constipation, melena, or hematochezia. Does have a normocytic anemia.   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

## 2024-02-27 NOTE — PHYSICAL EXAM
[Alert] : alert [No Acute Distress] : no acute distress [Sclera] : the sclera and conjunctiva were normal [Hearing Threshold Finger Rub Not Hall] : hearing was normal [No Respiratory Distress] : no respiratory distress [Normal Appearance] : the appearance of the neck was normal [No Acc Muscle Use] : no accessory muscle use [Respiration, Rhythm And Depth] : normal respiratory rhythm and effort [Abdomen Tenderness] : non-tender [Heart Rate And Rhythm] : heart rate was normal and rhythm regular [Abdomen Soft] : soft [] : no rash [No Focal Deficits] : no focal deficits [Oriented To Time, Place, And Person] : oriented to person, place, and time

## 2024-02-27 NOTE — HISTORY OF PRESENT ILLNESS
[de-identified] : 59F here in postoperative visit s/p revision ESS (draf 3 frontal sinusotomy, ethmoid, sphenoid, maxillary) 1/29/24 for chronic pansinusitis. Intraoperatively, moderately severe frontoethmoid mucosal disease and thickened osteitic bone throughout.   She is doing well since last seen There is no pain. She is doing BID budesonide rinses. Her voice is still a bit hoarse since surgery.  Pathology:  1. Left middle turbinate: - Sinonasal and turbinate mucosa showing acute and chronic inflammation with reactive and metaplastic changes. . Right middle turbinate remnants: - Sinonasal and turbinate mucosa showing acute and chronic inflammation with reactive and metaplastic changes. 3. Bilateral sinus shaving and bilateral turbinates shavings: - Fragments of sinonasal mucosa showing acute and chronic inflammation and reactive changes - see Note. - Numerous fragments of bone showing reactive changes. ---Note: Although eosinophils are present, there is no definitive evidence of vasculitis or granuloma. ---Amyloid stains on blocks 3C and 3E are negative.  4. Right and left sinus contents: - Fragments of sinonasal mucosa showing chronic inflammation.  ROS otherwise unremarkable.

## 2024-02-27 NOTE — PHYSICAL EXAM
[No Acute Distress] : no acute distress [Alert] : alert [Sclera] : the sclera and conjunctiva were normal [Hearing Threshold Finger Rub Not [place]] : hearing was normal [Normal Appearance] : the appearance of the neck was normal [No Respiratory Distress] : no respiratory distress [No Acc Muscle Use] : no accessory muscle use [Respiration, Rhythm And Depth] : normal respiratory rhythm and effort [Abdomen Tenderness] : non-tender [Heart Rate And Rhythm] : heart rate was normal and rhythm regular [Abdomen Soft] : soft [] : no rash [No Focal Deficits] : no focal deficits [Oriented To Time, Place, And Person] : oriented to person, place, and time

## 2024-02-27 NOTE — CONSULT LETTER
[Dear  ___] : Dear  [unfilled], [Consult Closing:] : Thank you very much for allowing me to participate in the care of this patient.  If you have any questions, please do not hesitate to contact me. [Courtesy Letter:] : I had the pleasure of seeing your patient, [unfilled], in my office today. [Sincerely,] : Sincerely, [DrJose Cruz  ___] : Dr. MEJIAS [Benji Mixon MD] : Benji Mixon MD  [[facility]] : [facility] [Department of Otolaryngology, Head and Neck Surgery] : Department of Otolaryngology, Head and Neck Surgery

## 2024-02-27 NOTE — DATA REVIEWED
[de-identified] : CT scan 4/28/17:\par  mucosal thickening within b/l L>R frontal sinuses w obstruction of left outflow tract. s/p b/l sphenoethmoidectomies w thickening of L>R maxillary mucosa and remnant ethmoid cells along the orbit.\par  This study was markedly improved from CT 4/4/17 which essentially showed pansinusitis and opacified sinuses; most recent scan shows some mucosal thickening and edema, but aerated sinuses.\par

## 2024-02-27 NOTE — PROCEDURE
[FreeTextEntry3] : Nasal Endoscopy: mild crusting and mucus suctioned paranasal sinuses patent mild anterior ethmoid edema right nasal floor remucosalizing common frontal patent, contour remnants removed no mucopus  Fiberoptic Laryngoscopy: upper airway widely patent no masses/lesions TVF fully mobile

## 2024-02-27 NOTE — ASSESSMENT
[FreeTextEntry1] : 59F with a h/o chronic sinusitis s/p ESS 2014 and 2015, asthma, Eosinophilic granulomatosis with polyangiitis (EGPA) who presents for screening colonoscopy consultation.   #Colon cancer screening #Anemia - will obtain iron studies - tentatively plan for bidirectional endoscopy with suprep at [facility] - risks/benefits/alternatives and need for post-procedural escort discussed  Christine Long MD Gastroenterology

## 2024-02-27 NOTE — ASSESSMENT
[FreeTextEntry1] : 59F with a h/o chronic sinusitis s/p ESS 2014 and 2015, asthma, Eosinophilic granulomatosis with polyangiitis (EGPA) who presents for screening colonoscopy consultation.   #Colon cancer screening #Anemia - will obtain iron studies - tentatively plan for bidirectional endoscopy with suprep at [facility] - risks/benefits/alternatives and need for post-procedural escort discussed  Christine Long MD Gastroenterology

## 2024-02-29 ENCOUNTER — NON-APPOINTMENT (OUTPATIENT)
Age: 59
End: 2024-02-29

## 2024-02-29 LAB
FERRITIN SERPL-MCNC: 121 NG/ML
IRON SATN MFR SERPL: 18 %
IRON SERPL-MCNC: 68 UG/DL
TIBC SERPL-MCNC: 386 UG/DL
UIBC SERPL-MCNC: 318 UG/DL

## 2024-04-09 ENCOUNTER — APPOINTMENT (OUTPATIENT)
Age: 59
End: 2024-04-09

## 2024-04-14 ENCOUNTER — NON-APPOINTMENT (OUTPATIENT)
Age: 59
End: 2024-04-14

## 2024-04-15 ENCOUNTER — APPOINTMENT (OUTPATIENT)
Dept: RHEUMATOLOGY | Facility: CLINIC | Age: 59
End: 2024-04-15
Payer: MEDICAID

## 2024-04-15 ENCOUNTER — LABORATORY RESULT (OUTPATIENT)
Age: 59
End: 2024-04-15

## 2024-04-15 VITALS
SYSTOLIC BLOOD PRESSURE: 123 MMHG | HEART RATE: 66 BPM | TEMPERATURE: 97.2 F | DIASTOLIC BLOOD PRESSURE: 83 MMHG | HEIGHT: 65 IN | WEIGHT: 129 LBS | BODY MASS INDEX: 21.49 KG/M2 | OXYGEN SATURATION: 82 %

## 2024-04-15 DIAGNOSIS — D72.10 EOSINOPHILIA, UNSPECIFIED: ICD-10-CM

## 2024-04-15 DIAGNOSIS — R93.89 ABNORMAL FINDINGS ON DIAGNOSTIC IMAGING OF OTHER SPECIFIED BODY STRUCTURES: ICD-10-CM

## 2024-04-15 DIAGNOSIS — J45.909 UNSPECIFIED ASTHMA, UNCOMPLICATED: ICD-10-CM

## 2024-04-15 PROCEDURE — 99205 OFFICE O/P NEW HI 60 MIN: CPT

## 2024-04-15 PROCEDURE — G2211 COMPLEX E/M VISIT ADD ON: CPT | Mod: NC,1L

## 2024-04-15 PROCEDURE — 36415 COLL VENOUS BLD VENIPUNCTURE: CPT

## 2024-04-15 RX ORDER — BUDESONIDE 0.5 MG/2ML
0.5 INHALANT ORAL
Qty: 3 | Refills: 3 | Status: DISCONTINUED | COMMUNITY
Start: 2024-01-31 | End: 2024-04-15

## 2024-04-15 RX ORDER — OXYCODONE AND ACETAMINOPHEN 5; 325 MG/1; MG/1
5-325 TABLET ORAL
Qty: 30 | Refills: 0 | Status: DISCONTINUED | COMMUNITY
Start: 2024-01-29 | End: 2024-04-15

## 2024-04-15 RX ORDER — SODIUM CHLORIDE 0.65 %
0.65 AEROSOL, SPRAY (ML) NASAL
Qty: 2 | Refills: 5 | Status: DISCONTINUED | COMMUNITY
Start: 2024-01-29 | End: 2024-04-15

## 2024-04-15 RX ORDER — FLUTICASONE PROPIONATE 93 UG/1
93 SPRAY, METERED NASAL
Refills: 0 | Status: ACTIVE | COMMUNITY

## 2024-04-15 RX ORDER — SODIUM SULFATE, POTASSIUM SULFATE AND MAGNESIUM SULFATE 1.6; 3.13; 17.5 G/177ML; G/177ML; G/177ML
17.5-3.13-1.6 SOLUTION ORAL
Qty: 1 | Refills: 0 | Status: DISCONTINUED | COMMUNITY
Start: 2024-02-27 | End: 2024-04-15

## 2024-04-15 RX ORDER — OLOPATADINE HYDROCHLORIDE 7 MG/ML
0.7 SOLUTION OPHTHALMIC
Qty: 2 | Refills: 0 | Status: DISCONTINUED | COMMUNITY
Start: 2017-05-31 | End: 2024-04-15

## 2024-04-15 RX ORDER — PREDNISONE 10 MG/1
10 TABLET ORAL
Qty: 38 | Refills: 0 | Status: DISCONTINUED | COMMUNITY
Start: 2024-01-29 | End: 2024-04-15

## 2024-04-15 RX ORDER — BECLOMETHASONE DIPROPIONATE 80 UG/1
80 AEROSOL, METERED NASAL
Qty: 2 | Refills: 10 | Status: DISCONTINUED | COMMUNITY
Start: 2017-05-02 | End: 2024-04-15

## 2024-04-15 RX ORDER — EPINEPHRINE 0.3 MG/.3ML
0.3 INJECTION INTRAMUSCULAR
Qty: 2 | Refills: 0 | Status: DISCONTINUED | COMMUNITY
Start: 2016-10-06 | End: 2024-04-15

## 2024-04-15 RX ORDER — CLINDAMYCIN HYDROCHLORIDE 300 MG/1
300 CAPSULE ORAL
Qty: 30 | Refills: 0 | Status: DISCONTINUED | COMMUNITY
Start: 2024-01-29 | End: 2024-04-15

## 2024-04-15 NOTE — PHYSICAL EXAM
[General Appearance - Alert] : alert [General Appearance - In No Acute Distress] : in no acute distress [General Appearance - Well Nourished] : well nourished [General Appearance - Well Developed] : well developed [General Appearance - Well-Appearing] : healthy appearing [Sclera] : the sclera and conjunctiva were normal [Examination Of The Oral Cavity] : the lips and gums were normal [Oropharynx] : the oropharynx was normal [Respiration, Rhythm And Depth] : normal respiratory rhythm and effort [Exaggerated Use Of Accessory Muscles For Inspiration] : no accessory muscle use [Auscultation Breath Sounds / Voice Sounds] : lungs were clear to auscultation bilaterally [Edema] : there was no peripheral edema [No Spinal Tenderness] : no spinal tenderness [Abnormal Walk] : normal gait [Nail Clubbing] : no clubbing  or cyanosis of the fingernails [Musculoskeletal - Swelling] : no joint swelling seen [Motor Tone] : muscle strength and tone were normal [] : no rash [Skin Lesions] : no skin lesions [Oriented To Time, Place, And Person] : oriented to person, place, and time [Impaired Insight] : insight and judgment were intact [Affect] : the affect was normal [Mood] : the mood was normal

## 2024-04-15 NOTE — DATA REVIEWED
[FreeTextEntry1] : Echo [facility] 1/18/2024: 1. Borderline low systolic function with a calculated ejection fraction of 51%. 2. History of eosinophilia - no echocardiographic evidence of Holden's endocarditis. 3. Normal right ventricular size and systolic function. 4. No significant valvular disease. 5. Trivial pericardial effusion. 6. No evidence of pulmonary hypertension.  CT chest [facility] 1/2024 personally reviewed : patchy areas of ground glass and consolidation bilaterally, air trapping, mild bronchiectasis and airway wall thickenining

## 2024-04-15 NOTE — ASSESSMENT
[FreeTextEntry1] :  59 year old woman with history of eosinophilia referred for rheumatology evaluation.  Patient with illness in December 2023-January 2024 associated with cough, fevers, sinusitis and overall not feeling well.  Patient noted to have elevated leukocytosis with eosinophilia around 7-10.  Patient evaluated by pulmonary ntoed to have abnormal CT scan with infiltrates suggestive of EGPA as well as sinus surgery a/p biopsy without evidence of vasculitis, although eosinophils noted, ANCA testing negative.  At this time, patient feeling much improved following sinus surgery,. received one course of steroid prior to surgery as well as antibiotics, no other steroids since that time.  Hematology workup for eosinophilia was negative for malignancy.  At this time patient continues to pick sent, will repeat labs today in office, including CBC, ANCA, CMP, ESR, CRP, rheumatoid factor, CCP, GARY with reflex to serologies.  Patient is scheduled for endoscopy and colonoscopy as well for follow-up. If all studies unremarkable, may consider trial off montelukast as well as in rare cases, patients may present with systemic eosinophilia, sometimes presenting with clinical features of vasculitis consistent with eosinophilic granulomatosis with polyangiitis, these clinical features may include eosinophilia, vasculitic rash, worsening pulmonary symptoms, cardiac complications, and/or neuropathy. A causal association between montelukast and these underlying conditions has not been established. Will repeat CT chest as well.  Further management pending results.

## 2024-04-15 NOTE — HISTORY OF PRESENT ILLNESS
[FreeTextEntry1] : New Patient 59 year old woman referred for rheumatology evaluation. Patient following with pulmonary, initially referred for evaluation by ENT due to plans for sinus surgery. Feels much better following sinus surgery 1/29/2024 Patient noted to have elevated eosinophils on labs On dupixent for three years, felt better response than nucala Took nucala for about four months in 2023  During work up for elevated eosinophils: Abnormal ct chest Had acute illness, associated with night sweats during 1-2 months of being sick, end of 12/2023-1/2024 No night sweats since surgery, feeling well Mother passed away in July 2023 after long illness with HTLV1, followed at [facility]  Hematologist evaluation for elevated eosinophils: Dr. Verde evaluated as well Cardiac work up sinus scan blood tests   Meds: Dupixent every two weeks Taking lexapro 10 xhance nasal trelegy daily montelukast years for many years, feels benefit, if misses for about three or more days feels increase in allergy symptoms Before the surgery, prednisone for short course, which helped No rashes No oral uclers No cough at this time, no chest pain or shortness of breath No paresthesia  Since the surgery, feels well, back to baseline Last blood tests January Wil see ENT Friday for follow up   CT chest 12/2023 abnormal Also takes: Vitamin D  biotin 78043

## 2024-04-16 LAB
ALBUMIN SERPL ELPH-MCNC: 4.8 G/DL
ALP BLD-CCNC: 92 U/L
ALT SERPL-CCNC: 13 U/L
ANION GAP SERPL CALC-SCNC: 15 MMOL/L
AST SERPL-CCNC: 18 U/L
BASOPHILS # BLD AUTO: 0.11 K/UL
BASOPHILS NFR BLD AUTO: 1.5 %
BILIRUB SERPL-MCNC: 0.4 MG/DL
BUN SERPL-MCNC: 11 MG/DL
CALCIUM SERPL-MCNC: 9.7 MG/DL
CHLORIDE SERPL-SCNC: 102 MMOL/L
CO2 SERPL-SCNC: 25 MMOL/L
CREAT SERPL-MCNC: 0.62 MG/DL
CRP SERPL-MCNC: <3 MG/L
EGFR: 101 ML/MIN/1.73M2
EOSINOPHIL # BLD AUTO: 0.97 K/UL
EOSINOPHIL NFR BLD AUTO: 13.6 %
ERYTHROCYTE [SEDIMENTATION RATE] IN BLOOD BY WESTERGREN METHOD: 7 MM/HR
GLUCOSE SERPL-MCNC: 89 MG/DL
HCT VFR BLD CALC: 38.9 %
HGB BLD-MCNC: 12.2 G/DL
IMM GRANULOCYTES NFR BLD AUTO: 0.1 %
LYMPHOCYTES # BLD AUTO: 2.6 K/UL
LYMPHOCYTES NFR BLD AUTO: 36.4 %
MAN DIFF?: NORMAL
MCHC RBC-ENTMCNC: 30.1 PG
MCHC RBC-ENTMCNC: 31.4 GM/DL
MCV RBC AUTO: 96 FL
MONOCYTES # BLD AUTO: 0.44 K/UL
MONOCYTES NFR BLD AUTO: 6.2 %
NEUTROPHILS # BLD AUTO: 3.01 K/UL
NEUTROPHILS NFR BLD AUTO: 42.2 %
PLATELET # BLD AUTO: 218 K/UL
POTASSIUM SERPL-SCNC: 4.3 MMOL/L
PROT SERPL-MCNC: 7.2 G/DL
RBC # BLD: 4.05 M/UL
RBC # FLD: 17.9 %
RHEUMATOID FACT SER QL: 11 IU/ML
SODIUM SERPL-SCNC: 142 MMOL/L
WBC # FLD AUTO: 7.14 K/UL

## 2024-04-17 LAB
CCP AB SER IA-ACNC: <8 UNITS
RF+CCP IGG SER-IMP: NEGATIVE

## 2024-04-18 LAB — ANACR T: NEGATIVE

## 2024-04-19 ENCOUNTER — APPOINTMENT (OUTPATIENT)
Dept: OTOLARYNGOLOGY | Facility: CLINIC | Age: 59
End: 2024-04-19
Payer: MEDICAID

## 2024-04-19 DIAGNOSIS — J32.4 CHRONIC PANSINUSITIS: ICD-10-CM

## 2024-04-19 DIAGNOSIS — J33.9 NASAL POLYP, UNSPECIFIED: ICD-10-CM

## 2024-04-19 PROCEDURE — 99213 OFFICE O/P EST LOW 20 MIN: CPT | Mod: 25

## 2024-04-24 ENCOUNTER — TRANSCRIPTION ENCOUNTER (OUTPATIENT)
Age: 59
End: 2024-04-24

## 2024-04-25 ENCOUNTER — APPOINTMENT (OUTPATIENT)
Dept: GASTROENTEROLOGY | Facility: HOSPITAL | Age: 59
End: 2024-04-25

## 2024-04-25 ENCOUNTER — RESULT REVIEW (OUTPATIENT)
Age: 59
End: 2024-04-25

## 2024-04-25 ENCOUNTER — OUTPATIENT (OUTPATIENT)
Dept: OUTPATIENT SERVICES | Facility: HOSPITAL | Age: 59
LOS: 1 days | Discharge: HOME | End: 2024-04-25
Payer: MEDICAID

## 2024-04-25 VITALS — WEIGHT: 125 LBS | HEIGHT: 65 IN

## 2024-04-25 DIAGNOSIS — Z98.890 OTHER SPECIFIED POSTPROCEDURAL STATES: Chronic | ICD-10-CM

## 2024-04-25 PROCEDURE — 88305 TISSUE EXAM BY PATHOLOGIST: CPT | Mod: 26

## 2024-04-25 PROCEDURE — 45380 COLONOSCOPY AND BIOPSY: CPT | Mod: 59

## 2024-04-25 PROCEDURE — 43239 EGD BIOPSY SINGLE/MULTIPLE: CPT

## 2024-04-25 PROCEDURE — 45385 COLONOSCOPY W/LESION REMOVAL: CPT | Mod: PT

## 2024-04-25 PROCEDURE — 45385 COLONOSCOPY W/LESION REMOVAL: CPT

## 2024-04-25 PROCEDURE — 45380 COLONOSCOPY AND BIOPSY: CPT | Mod: XS,PT

## 2024-04-25 PROCEDURE — 88305 TISSUE EXAM BY PATHOLOGIST: CPT

## 2024-04-26 ENCOUNTER — OUTPATIENT (OUTPATIENT)
Dept: OUTPATIENT SERVICES | Facility: HOSPITAL | Age: 59
LOS: 1 days | End: 2024-04-26

## 2024-04-26 ENCOUNTER — APPOINTMENT (OUTPATIENT)
Dept: CT IMAGING | Facility: HOSPITAL | Age: 59
End: 2024-04-26
Payer: MEDICAID

## 2024-04-26 DIAGNOSIS — Z98.890 OTHER SPECIFIED POSTPROCEDURAL STATES: Chronic | ICD-10-CM

## 2024-04-26 LAB — SURGICAL PATHOLOGY STUDY: SIGNIFICANT CHANGE UP

## 2024-04-26 PROCEDURE — 71250 CT THORAX DX C-: CPT | Mod: 26

## 2024-04-26 PROCEDURE — 71250 CT THORAX DX C-: CPT

## 2024-05-06 RX ORDER — DUPILUMAB 200 MG/1.14ML
INJECTION, SOLUTION SUBCUTANEOUS
Refills: 0 | Status: ACTIVE | COMMUNITY

## 2024-05-17 ENCOUNTER — NON-APPOINTMENT (OUTPATIENT)
Age: 59
End: 2024-05-17

## 2024-05-22 NOTE — HISTORY OF PRESENT ILLNESS
[Never] : never [TextBox_4] : 12/07/2023 [Bradley]: Asked to evaluate patient by Dr Mixon for pulm eval prior to sinus surgery. Had sinus problems all her life, has been seeing allergist for at least 15 years (Dr. Olivo). C/o cough ongoing for 2 weeks, no phlegm, fever or chills. Has dyspnea in cold weather. On Dupixent every 2 weeks for 3 years. Tried Fasenra prior to dupixent, which did not help as much as dupixent.. Says she had a good response to prednisone usually. History of asthma since childhood.  Has the Flu shot, most recent Covid-19 booster 3 weeks ago, no pneumonia shot. No recent lung function test. Has not seen pulmonologist in 5 years. No history of ibuprofen or aspirin sensitivity. No known cardiac disease. Never smoker, has a friend she stays with sometimes who smokes cigars. Recent loss of mother. Worked in advertising. No mold/dust exposure. Has a hypoallergenic dog. No birds.  1/12/2024: In interim we got her connected w primary care for surg clearance; has high grade peripheral eosinophilia, ANCA neg, sent to heme to exclude any primary heme disorder and saw Dr Verde and has workup pending. On Trelegy daily and is fine from chest perspective but having night sweats, and lightheadedness. No rash. No peripheral neuropathy.

## 2024-05-22 NOTE — ASSESSMENT
[FreeTextEntry1] : Data reviewed:  Labs 12/2023: eos 7700 then 10k, ANCA neg ECG [facility] 1/2024: NSR  CT chest [facility] 1/2024 personally reviewed : patchy areas of ground glass and consolidation bilaterally, air trapping, mild bronchiectasis and airway wall thickenining  Dixon 12/07/2023 : normal / FENO 43 (albuterol prior)  Impression: Suspect EGPA   - hx asthma  - >10% peripheral eos  - sinus disease  - pulmonary opacities  Plan: Cardiac eval - already had ECG, get echo, check troponin and BNP. Also check IgE, coags, and UA. Then favor bronch for cultures, cell diff, potentially bx. Rheum consultation. Stay on Trelegy and Dupixent. -- Echo [facility] 1/18/2024: 1. Borderline low systolic function with a calculated ejection fraction of 51%. 2. History of eosinophilia - no echocardiographic evidence of Holden's endocarditis. 3. Normal right ventricular size and systolic function. 4. No significant valvular disease. 5. Trivial pericardial effusion. 6. No evidence of pulmonary hypertension.  Sinus surgery next / bronch if needed / see rheum. Will get renal sono. -- Renal sono 1/2024 : negative Sinus surgery was Monday and no more night sweats. Await pathology. D/w her.

## 2024-06-11 ENCOUNTER — NON-APPOINTMENT (OUTPATIENT)
Age: 59
End: 2024-06-11

## 2024-06-12 DIAGNOSIS — H57.9 UNSPECIFIED DISORDER OF EYE AND ADNEXA: ICD-10-CM

## 2024-08-12 NOTE — PROCEDURE
Patient reviewed on 8/12/2024.  Okay to proceed at [facility]. The following pre-procedure instructions and arrival time have been reviewed with patient via phone and sent to patient portal for review.  Patient verbalized an understanding.  Pt to be accompanied by her mom day of procedure as responsible .    Dear Neha ,     Please read over the following pre-procedure instructions in it's entirety as there is helpful information here to get you well prepared for your upcoming procedure.              You are scheduled for a procedure with Dr. Jaime on 8/14/2024.    Your scheduled arrival time is 11:30 am.  This arrival time is roughly 1 hour before your anticipated procedure time to allow sufficient time for pre-op..    Please wear comfortable clothes. You will be placed in a gown for your procedure.  Please do not wear a dress.  This procedure will take place at the [facility] at the corner of [facility] and [facility].  It is in the [facility] next to [facility].  The address is:     [address] [place] [address].  [place] [address]     After entering the building, you will proceed to the second floor where you can check in with registration. You should take any medications that you routinely take for blood pressure, heart medications, thyroid, cholesterol, etc.      The fasting restrictions are dependent on whether or not you are receiving sedation.  Sedation is not available for all procedures.      Your fasting instructions are as follow:  IV sedation. You should not eat for 8 hours and can only drink clear liquids (water or black coffee without cream/sugar) up until 2 hours before your scheduled time.  You CANNOT drive yourself and must have a .     If you are on blood thinners, you need to follow the anticoagulation instructions that had been discussed previously.  You should only stop the blood thinners if it was approved by your primary care physician or your  cardiologist.  In the event that you are not able to stop your blood thinners, a blood thinner was not listed on your medication list, or we were not able to get clearance from your cardiologist, then the procedure may have to be postponed/canceled.      IF you were told to stop your blood thinners, this is how long you should generally hold some of the more common ones.  Remember that stopping blood thinners is only necessary for certain procedures. If you are unsure of your instructions, please call us.   Aspirin - 5 days  Plavix/Clopidogrel - 7 days  Warfarin / Coumadin - 5 days  Eliquis - 3 days  Pradaxa/Dabigatran - 4 days  Xarelto/Rivaroxaban - 3 days     If you are a diabetic, do not take your medication if you will be fasting, but bring it with you. Please plan on being here for roughly 3 hours.     Please call us if you have been sick (running fever, having any flu-like symptoms) or have been taking ANTIBIOTICS in the past 2 weeks or had any outpatient procedures other than with us (colonoscopy, endoscopy, OBGYN, dental, etc.).     If you have been previously COVID positive, you will need to hold off on your procedure until you are symptom free for 10 days. If you did not have any symptoms, you can have your procedure 10 days from your positive test result.       On the morning of your procedure:  *HOLD ALL VITAMINS, MINERALS, HERBS (INCLUDING HERBAL TEAS) AND SUPPLEMENTS  *SHOWER WITH ANTIBACTERIAL SOAP (EX. DIAL) NIGHT BEFORE AND MORNING OF PROCEDURE  *DO NOT APPLY ANY LOTIONS, OILS, POWDERS, PERFUME/COLOGNE, OINTMENTS, GELS, CREAMS, MAKEUP OR DEODORANT TO YOUR SKIN MORNING OF PROCEDURE  *LEAVE JEWELRY AND ANY VALUABLES AT HOME  *WEAR LOOSE COMFORTABLE CLOTHING (PREFERABLY A BUTTON UP SHIRT)     Please reply to this message as receipt of delivery.     Thank you,  [facility]  [place], LPN [facility]  Pre-Admit           [FreeTextEntry3] : Nasal Endoscopy:\par mild  sinonasal mucosal boggy edema and mild thick clear/white mucus suctioned\par R NC: s/p subtotal middle turbinectomy w nubbin of edematous middle turbinate sitting adjacent to posterior fontanelle w thin mucus suctioned; ethmoid patent with polypoid edematous mucosa\par L NC: middle turbinate medialized and middle meatus nearly obstructed by polyposis. posterior fontanelle patent and suctioned of thick mucoid debris/mucopus

## 2024-08-16 ENCOUNTER — APPOINTMENT (OUTPATIENT)
Dept: OTOLARYNGOLOGY | Facility: CLINIC | Age: 59
End: 2024-08-16
Payer: MEDICAID

## 2024-08-16 DIAGNOSIS — J33.9 NASAL POLYP, UNSPECIFIED: ICD-10-CM

## 2024-08-16 DIAGNOSIS — J32.4 CHRONIC PANSINUSITIS: ICD-10-CM

## 2024-08-16 PROCEDURE — 31231 NASAL ENDOSCOPY DX: CPT

## 2024-08-16 PROCEDURE — 99213 OFFICE O/P EST LOW 20 MIN: CPT | Mod: 25

## 2024-08-16 RX ORDER — PREDNISONE 10 MG/1
10 TABLET ORAL
Qty: 38 | Refills: 0 | Status: ACTIVE | COMMUNITY
Start: 2024-08-16 | End: 1900-01-01

## 2024-08-16 RX ORDER — BUDESONIDE 0.5 MG/2ML
0.5 INHALANT ORAL
Qty: 6 | Refills: 3 | Status: ACTIVE | COMMUNITY
Start: 2024-08-16 | End: 1900-01-01

## 2024-08-16 NOTE — PROCEDURE
[FreeTextEntry3] : Nasal Endoscopy: paranasal sinuses patent frontal beak mucosalized polyps in common frontal and anterior ethmoid no mucopus or mucin

## 2024-08-16 NOTE — CONSULT LETTER
[Dear  ___] : Dear  [unfilled], [Courtesy Letter:] : I had the pleasure of seeing your patient, [unfilled], in my office today. [Sincerely,] : Sincerely, [Consult Closing:] : Thank you very much for allowing me to participate in the care of this patient.  If you have any questions, please do not hesitate to contact me. [DrJose Cruz  ___] : Dr. MEJIAS [Benji Mixon MD] : Benji Mixon MD  [Department of Otolaryngology, Head and Neck Surgery] : Department of Otolaryngology, Head and Neck Surgery [[facility]] : [facility]

## 2024-08-16 NOTE — ASSESSMENT
[FreeTextEntry1] : 59F here in postoperative visit s/p revision ESS (draf 3 frontal sinusotomy, ethmoid, sphenoid, maxillary) 1/29/24 for chronic pansinusitis. Intraoperatively, moderately severe frontoethmoid mucosal disease and thickened osteitic bone throughout.  She is doing well since last seen 4 months ago. For unknown reasons, she has not been rinsing at all since last visit. She remains on dupixent. Sense of smell has been very dull the past 2 months. On exam, nasal endoscopy shows expected postsurgical changes with widely patent nasal airways and paranasal sinuses; there is mild frontoethmoid polyp regrowth but no mucin or mucopus. She is doing very well s/p extensive sinus surgery just under 7 months ago. However, there is now hyposmia and she has not been irrigating for 4 months!! This should improve w better hygiene - restart BID budesonide rinses. Will give prednisone burst to help. RTO 1 month.

## 2024-08-16 NOTE — DATA REVIEWED
[de-identified] : CT scan 4/28/17:\par  mucosal thickening within b/l L>R frontal sinuses w obstruction of left outflow tract. s/p b/l sphenoethmoidectomies w thickening of L>R maxillary mucosa and remnant ethmoid cells along the orbit.\par  This study was markedly improved from CT 4/4/17 which essentially showed pansinusitis and opacified sinuses; most recent scan shows some mucosal thickening and edema, but aerated sinuses.\par

## 2024-08-16 NOTE — HISTORY OF PRESENT ILLNESS
[de-identified] : 59F here in postoperative visit s/p revision ESS (draf 3 frontal sinusotomy, ethmoid, sphenoid, maxillary) 1/29/24 for chronic pansinusitis. Intraoperatively, moderately severe frontoethmoid mucosal disease and thickened osteitic bone throughout.   She is doing well since last seen 4 months ago. For unknown reasons, she has not been rinsing at all since last visit. She remains on dupixent. Sense of smell has been very dull the past 2 months.  ROS otherwise unremarkable.

## 2024-09-25 ENCOUNTER — NON-APPOINTMENT (OUTPATIENT)
Age: 59
End: 2024-09-25

## 2024-09-26 ENCOUNTER — APPOINTMENT (OUTPATIENT)
Dept: OTOLARYNGOLOGY | Facility: CLINIC | Age: 59
End: 2024-09-26
Payer: MEDICAID

## 2024-09-26 DIAGNOSIS — J33.9 NASAL POLYP, UNSPECIFIED: ICD-10-CM

## 2024-09-26 DIAGNOSIS — J32.4 CHRONIC PANSINUSITIS: ICD-10-CM

## 2024-09-26 PROCEDURE — 31231 NASAL ENDOSCOPY DX: CPT

## 2024-09-26 PROCEDURE — 99213 OFFICE O/P EST LOW 20 MIN: CPT | Mod: 25

## 2024-09-26 NOTE — ASSESSMENT
[FreeTextEntry1] : 59F here in followup visit s/p revision ESS (draf 3 frontal sinusotomy, ethmoid, sphenoid, maxillary) 1/29/24 for chronic pansinusitis. Intraoperatively, moderately severe frontoethmoid mucosal disease and thickened osteitic bone throughout. She is doing very well since last seen > 1 month ago. She remains on budesonide rinses daily. She remains on dupixent. She is breathing great and olfaction is strong. Sense of smell has been very dull the past 2 months. On exam, nasal endoscopy shows well healed postsurgical changes with widely patent nasal airways and paranasal sinuses. She is doing very well s/p extensive sinus surgery >8 months months ago. For now, BID budesonide rinses. RTO 4 months.

## 2024-09-26 NOTE — HISTORY OF PRESENT ILLNESS
[de-identified] : 59F here in followup visit s/p revision ESS (draf 3 frontal sinusotomy, ethmoid, sphenoid, maxillary) 1/29/24 for chronic pansinusitis. Intraoperatively, moderately severe frontoethmoid mucosal disease and thickened osteitic bone throughout.   She is doing very well since last seen > 1 month ago. She remains on budesonide rinses daily. She remains on dupixent. She is breathing great and olfaction is strong. Sense of smell has been very dull the past 2 months.  ROS otherwise unremarkable.

## 2024-09-26 NOTE — CONSULT LETTER
[Dear  ___] : Dear  [unfilled], [Courtesy Letter:] : I had the pleasure of seeing your patient, [unfilled], in my office today. [Consult Closing:] : Thank you very much for allowing me to participate in the care of this patient.  If you have any questions, please do not hesitate to contact me. [Sincerely,] : Sincerely, [DrJose Cruz  ___] : Dr. MEJIAS [Benji Mixon MD] : Benji Mixon MD  [Department of Otolaryngology, Head and Neck Surgery] : Department of Otolaryngology, Head and Neck Surgery [[facility]] : [facility]

## 2024-09-26 NOTE — DATA REVIEWED
[de-identified] : CT scan 4/28/17:\par  mucosal thickening within b/l L>R frontal sinuses w obstruction of left outflow tract. s/p b/l sphenoethmoidectomies w thickening of L>R maxillary mucosa and remnant ethmoid cells along the orbit.\par  This study was markedly improved from CT 4/4/17 which essentially showed pansinusitis and opacified sinuses; most recent scan shows some mucosal thickening and edema, but aerated sinuses.\par

## 2024-09-26 NOTE — PROCEDURE
[FreeTextEntry3] : Nasal Endoscopy: paranasal sinuses patent scant polypoid change b/l anterosuperior ethmoid frontal beak mucosalized no mucopus or mucin

## 2024-10-24 ENCOUNTER — APPOINTMENT (OUTPATIENT)
Dept: PULMONOLOGY | Facility: CLINIC | Age: 59
End: 2024-10-24
Payer: MEDICAID

## 2024-10-24 VITALS
HEART RATE: 70 BPM | HEIGHT: 65 IN | OXYGEN SATURATION: 97 % | SYSTOLIC BLOOD PRESSURE: 114 MMHG | DIASTOLIC BLOOD PRESSURE: 80 MMHG | WEIGHT: 129 LBS | TEMPERATURE: 97.4 F | BODY MASS INDEX: 21.49 KG/M2

## 2024-10-24 DIAGNOSIS — D72.10 EOSINOPHILIA, UNSPECIFIED: ICD-10-CM

## 2024-10-24 DIAGNOSIS — J45.909 UNSPECIFIED ASTHMA, UNCOMPLICATED: ICD-10-CM

## 2024-10-24 PROCEDURE — 36415 COLL VENOUS BLD VENIPUNCTURE: CPT

## 2024-10-24 PROCEDURE — 94010 BREATHING CAPACITY TEST: CPT

## 2024-10-24 PROCEDURE — 99214 OFFICE O/P EST MOD 30 MIN: CPT | Mod: 25

## 2024-10-24 PROCEDURE — 95012 NITRIC OXIDE EXP GAS DETER: CPT

## 2024-10-25 LAB
BASOPHILS # BLD AUTO: 0.07 K/UL
BASOPHILS NFR BLD AUTO: 0.9 %
EOSINOPHIL # BLD AUTO: 0.67 K/UL
EOSINOPHIL NFR BLD AUTO: 9 %
HCT VFR BLD CALC: 37.6 %
HGB BLD-MCNC: 12.1 G/DL
IMM GRANULOCYTES NFR BLD AUTO: 0.3 %
LYMPHOCYTES # BLD AUTO: 2.5 K/UL
LYMPHOCYTES NFR BLD AUTO: 33.4 %
MAN DIFF?: NORMAL
MCHC RBC-ENTMCNC: 31.5 PG
MCHC RBC-ENTMCNC: 32.2 GM/DL
MCV RBC AUTO: 97.9 FL
MONOCYTES # BLD AUTO: 0.45 K/UL
MONOCYTES NFR BLD AUTO: 6 %
NEUTROPHILS # BLD AUTO: 3.77 K/UL
NEUTROPHILS NFR BLD AUTO: 50.4 %
PLATELET # BLD AUTO: 225 K/UL
RBC # BLD: 3.84 M/UL
RBC # FLD: 14.1 %
WBC # FLD AUTO: 7.48 K/UL

## 2024-12-25 PROBLEM — F10.90 ALCOHOL USE: Status: ACTIVE | Noted: 2017-05-02

## 2025-01-16 ENCOUNTER — APPOINTMENT (OUTPATIENT)
Dept: OTOLARYNGOLOGY | Facility: CLINIC | Age: 60
End: 2025-01-16
Payer: MEDICAID

## 2025-01-16 ENCOUNTER — NON-APPOINTMENT (OUTPATIENT)
Age: 60
End: 2025-01-16

## 2025-01-16 VITALS — BODY MASS INDEX: 21.66 KG/M2 | HEIGHT: 65 IN | WEIGHT: 130 LBS

## 2025-01-16 DIAGNOSIS — J33.9 NASAL POLYP, UNSPECIFIED: ICD-10-CM

## 2025-01-16 DIAGNOSIS — J32.4 CHRONIC PANSINUSITIS: ICD-10-CM

## 2025-01-16 PROCEDURE — 31231 NASAL ENDOSCOPY DX: CPT

## 2025-01-16 PROCEDURE — 99213 OFFICE O/P EST LOW 20 MIN: CPT | Mod: 25

## 2025-01-24 ENCOUNTER — LABORATORY RESULT (OUTPATIENT)
Age: 60
End: 2025-01-24

## 2025-01-24 ENCOUNTER — APPOINTMENT (OUTPATIENT)
Dept: INTERNAL MEDICINE | Facility: CLINIC | Age: 60
End: 2025-01-24

## 2025-01-24 VITALS
HEIGHT: 65 IN | HEART RATE: 67 BPM | BODY MASS INDEX: 20.83 KG/M2 | OXYGEN SATURATION: 96 % | DIASTOLIC BLOOD PRESSURE: 84 MMHG | RESPIRATION RATE: 16 BRPM | WEIGHT: 125 LBS | TEMPERATURE: 97.9 F | SYSTOLIC BLOOD PRESSURE: 125 MMHG

## 2025-01-24 DIAGNOSIS — Z86.69 PERSONAL HISTORY OF OTHER DISEASES OF THE NERVOUS SYSTEM AND SENSE ORGANS: ICD-10-CM

## 2025-01-24 DIAGNOSIS — F32.A ANXIETY DISORDER, UNSPECIFIED: ICD-10-CM

## 2025-01-24 DIAGNOSIS — R31.29 OTHER MICROSCOPIC HEMATURIA: ICD-10-CM

## 2025-01-24 DIAGNOSIS — J45.909 UNSPECIFIED ASTHMA, UNCOMPLICATED: ICD-10-CM

## 2025-01-24 DIAGNOSIS — Z87.898 PERSONAL HISTORY OF OTHER SPECIFIED CONDITIONS: ICD-10-CM

## 2025-01-24 DIAGNOSIS — Z23 ENCOUNTER FOR IMMUNIZATION: ICD-10-CM

## 2025-01-24 DIAGNOSIS — D64.9 ANEMIA, UNSPECIFIED: ICD-10-CM

## 2025-01-24 DIAGNOSIS — Z00.00 ENCOUNTER FOR GENERAL ADULT MEDICAL EXAMINATION W/OUT ABNORMAL FINDINGS: ICD-10-CM

## 2025-01-24 DIAGNOSIS — Z13.220 ENCOUNTER FOR SCREENING FOR LIPOID DISORDERS: ICD-10-CM

## 2025-01-24 DIAGNOSIS — G47.00 INSOMNIA, UNSPECIFIED: ICD-10-CM

## 2025-01-24 DIAGNOSIS — J30.81 ALLERGIC RHINITIS DUE TO ANIMAL (CAT) (DOG) HAIR AND DANDER: ICD-10-CM

## 2025-01-24 DIAGNOSIS — R73.03 PREDIABETES.: ICD-10-CM

## 2025-01-24 DIAGNOSIS — Z12.39 ENCOUNTER FOR OTHER SCREENING FOR MALIGNANT NEOPLASM OF BREAST: ICD-10-CM

## 2025-01-24 DIAGNOSIS — F41.9 ANXIETY DISORDER, UNSPECIFIED: ICD-10-CM

## 2025-01-24 DIAGNOSIS — F10.90 ALCOHOL USE, UNSPECIFIED, UNCOMPLICATED: ICD-10-CM

## 2025-01-24 PROCEDURE — 90471 IMMUNIZATION ADMIN: CPT

## 2025-01-24 PROCEDURE — 90715 TDAP VACCINE 7 YRS/> IM: CPT

## 2025-01-24 PROCEDURE — 36415 COLL VENOUS BLD VENIPUNCTURE: CPT

## 2025-01-24 PROCEDURE — 99396 PREV VISIT EST AGE 40-64: CPT | Mod: 25,GC

## 2025-01-24 RX ORDER — EPINEPHRINE 0.3 MG/.3ML
0.3 INJECTION INTRAMUSCULAR
Qty: 1 | Refills: 2 | Status: ACTIVE | COMMUNITY
Start: 2025-01-24 | End: 1900-01-01

## 2025-01-24 RX ORDER — ESCITALOPRAM OXALATE 10 MG/1
10 TABLET ORAL DAILY
Qty: 90 | Refills: 3 | Status: ACTIVE | COMMUNITY
Start: 2025-01-24 | End: 1900-01-01

## 2025-01-24 RX ORDER — TRAZODONE HYDROCHLORIDE 50 MG/1
50 TABLET ORAL
Qty: 30 | Refills: 2 | Status: ACTIVE | COMMUNITY
Start: 2025-01-24 | End: 1900-01-01

## 2025-01-28 ENCOUNTER — TRANSCRIPTION ENCOUNTER (OUTPATIENT)
Age: 60
End: 2025-01-28

## 2025-01-28 DIAGNOSIS — E53.8 DEFICIENCY OF OTHER SPECIFIED B GROUP VITAMINS: ICD-10-CM

## 2025-01-28 LAB
ALBUMIN SERPL ELPH-MCNC: 4.7 G/DL
ALP BLD-CCNC: 89 U/L
ALT SERPL-CCNC: 14 U/L
ANION GAP SERPL CALC-SCNC: 16 MMOL/L
AST SERPL-CCNC: 18 U/L
BASOPHILS # BLD AUTO: 0.08 K/UL
BASOPHILS NFR BLD AUTO: 1 %
BILIRUB SERPL-MCNC: 0.3 MG/DL
BUN SERPL-MCNC: 13 MG/DL
CALCIUM SERPL-MCNC: 10.1 MG/DL
CHLORIDE SERPL-SCNC: 102 MMOL/L
CHOLEST SERPL-MCNC: 254 MG/DL
CO2 SERPL-SCNC: 24 MMOL/L
CREAT SERPL-MCNC: 0.6 MG/DL
EGFR: 102 ML/MIN/1.73M2
EOSINOPHIL # BLD AUTO: 0.53 K/UL
EOSINOPHIL NFR BLD AUTO: 6.7 %
ESTIMATED AVERAGE GLUCOSE: 103 MG/DL
GLUCOSE SERPL-MCNC: 96 MG/DL
HBA1C MFR BLD HPLC: 5.2 %
HCT VFR BLD CALC: 39.6 %
HDLC SERPL-MCNC: 84 MG/DL
HGB BLD-MCNC: 12.5 G/DL
IMM GRANULOCYTES NFR BLD AUTO: 0.3 %
LDLC SERPL CALC-MCNC: 148 MG/DL
LYMPHOCYTES # BLD AUTO: 2.92 K/UL
LYMPHOCYTES NFR BLD AUTO: 37.1 %
MAN DIFF?: NORMAL
MCHC RBC-ENTMCNC: 31.6 G/DL
MCHC RBC-ENTMCNC: 32.2 PG
MCV RBC AUTO: 102.1 FL
MONOCYTES # BLD AUTO: 0.44 K/UL
MONOCYTES NFR BLD AUTO: 5.6 %
NEUTROPHILS # BLD AUTO: 3.88 K/UL
NEUTROPHILS NFR BLD AUTO: 49.3 %
NONHDLC SERPL-MCNC: 170 MG/DL
PLATELET # BLD AUTO: 251 K/UL
POTASSIUM SERPL-SCNC: 4.7 MMOL/L
PROT SERPL-MCNC: 7.3 G/DL
RBC # BLD: 3.88 M/UL
RBC # FLD: 15.5 %
SODIUM SERPL-SCNC: 142 MMOL/L
TRIGL SERPL-MCNC: 128 MG/DL
TSH SERPL-ACNC: 5.95 UIU/ML
WBC # FLD AUTO: 7.87 K/UL

## 2025-01-28 RX ORDER — FOLIC ACID 1 MG/1
1 TABLET ORAL DAILY
Qty: 90 | Refills: 2 | Status: ACTIVE | COMMUNITY
Start: 2025-01-28 | End: 1900-01-01

## 2025-03-07 ENCOUNTER — RESULT REVIEW (OUTPATIENT)
Age: 60
End: 2025-03-07

## 2025-03-07 ENCOUNTER — APPOINTMENT (OUTPATIENT)
Dept: ULTRASOUND IMAGING | Facility: CLINIC | Age: 60
End: 2025-03-07
Payer: MEDICAID

## 2025-03-07 ENCOUNTER — APPOINTMENT (OUTPATIENT)
Dept: MAMMOGRAPHY | Facility: CLINIC | Age: 60
End: 2025-03-07

## 2025-03-07 PROCEDURE — 77063 BREAST TOMOSYNTHESIS BI: CPT

## 2025-03-07 PROCEDURE — 76641 ULTRASOUND BREAST COMPLETE: CPT | Mod: 50

## 2025-03-07 PROCEDURE — 77067 SCR MAMMO BI INCL CAD: CPT

## 2025-03-10 DIAGNOSIS — R92.8 OTHER ABNORMAL AND INCONCLUSIVE FINDINGS ON DIAGNOSTIC IMAGING OF BREAST: ICD-10-CM

## 2025-03-11 ENCOUNTER — NON-APPOINTMENT (OUTPATIENT)
Age: 60
End: 2025-03-11

## 2025-03-14 ENCOUNTER — APPOINTMENT (OUTPATIENT)
Dept: ULTRASOUND IMAGING | Facility: CLINIC | Age: 60
End: 2025-03-14
Payer: MEDICAID

## 2025-03-14 ENCOUNTER — RESULT REVIEW (OUTPATIENT)
Age: 60
End: 2025-03-14

## 2025-03-14 PROCEDURE — 76642 ULTRASOUND BREAST LIMITED: CPT | Mod: RT

## 2025-03-17 ENCOUNTER — NON-APPOINTMENT (OUTPATIENT)
Age: 60
End: 2025-03-17

## 2025-03-25 ENCOUNTER — APPOINTMENT (OUTPATIENT)
Dept: OBGYN | Facility: CLINIC | Age: 60
End: 2025-03-25

## 2025-03-25 VITALS
SYSTOLIC BLOOD PRESSURE: 115 MMHG | WEIGHT: 133 LBS | OXYGEN SATURATION: 97 % | BODY MASS INDEX: 22.13 KG/M2 | HEART RATE: 72 BPM | DIASTOLIC BLOOD PRESSURE: 75 MMHG

## 2025-03-25 DIAGNOSIS — Z00.00 ENCOUNTER FOR GENERAL ADULT MEDICAL EXAMINATION W/OUT ABNORMAL FINDINGS: ICD-10-CM

## 2025-03-25 DIAGNOSIS — N95.8 OTHER SPECIFIED MENOPAUSAL AND PERIMENOPAUSAL DISORDERS: ICD-10-CM

## 2025-03-25 PROCEDURE — 99386 PREV VISIT NEW AGE 40-64: CPT

## 2025-03-25 PROCEDURE — 99459 PELVIC EXAMINATION: CPT

## 2025-03-25 RX ORDER — ESTRADIOL 0.1 MG/G
0.1 CREAM VAGINAL
Qty: 1 | Refills: 3 | Status: ACTIVE | COMMUNITY
Start: 2025-03-25 | End: 1900-01-01

## 2025-03-26 ENCOUNTER — NON-APPOINTMENT (OUTPATIENT)
Age: 60
End: 2025-03-26

## 2025-03-26 LAB — HPV HIGH+LOW RISK DNA PNL CVX: NOT DETECTED

## 2025-03-31 ENCOUNTER — NON-APPOINTMENT (OUTPATIENT)
Age: 60
End: 2025-03-31

## 2025-03-31 LAB — CYTOLOGY CVX/VAG DOC THIN PREP: ABNORMAL

## 2025-04-04 ENCOUNTER — NON-APPOINTMENT (OUTPATIENT)
Age: 60
End: 2025-04-04

## 2025-04-04 ENCOUNTER — EMERGENCY (EMERGENCY)
Facility: HOSPITAL | Age: 60
LOS: 1 days | Discharge: HOME | End: 2025-04-04
Attending: STUDENT IN AN ORGANIZED HEALTH CARE EDUCATION/TRAINING PROGRAM | Admitting: STUDENT IN AN ORGANIZED HEALTH CARE EDUCATION/TRAINING PROGRAM
Payer: MEDICAID

## 2025-04-04 VITALS
HEART RATE: 67 BPM | SYSTOLIC BLOOD PRESSURE: 106 MMHG | TEMPERATURE: 98 F | OXYGEN SATURATION: 99 % | RESPIRATION RATE: 18 BRPM | DIASTOLIC BLOOD PRESSURE: 65 MMHG

## 2025-04-04 VITALS
RESPIRATION RATE: 16 BRPM | DIASTOLIC BLOOD PRESSURE: 79 MMHG | SYSTOLIC BLOOD PRESSURE: 115 MMHG | OXYGEN SATURATION: 98 % | TEMPERATURE: 98 F | WEIGHT: 130.07 LBS | HEART RATE: 71 BPM

## 2025-04-04 DIAGNOSIS — Z98.890 OTHER SPECIFIED POSTPROCEDURAL STATES: Chronic | ICD-10-CM

## 2025-04-04 PROCEDURE — 70450 CT HEAD/BRAIN W/O DYE: CPT | Mod: MC

## 2025-04-04 PROCEDURE — 70450 CT HEAD/BRAIN W/O DYE: CPT | Mod: 26

## 2025-04-04 PROCEDURE — 71250 CT THORAX DX C-: CPT | Mod: MC

## 2025-04-04 PROCEDURE — 99284 EMERGENCY DEPT VISIT MOD MDM: CPT

## 2025-04-04 PROCEDURE — 71250 CT THORAX DX C-: CPT | Mod: 26

## 2025-04-04 PROCEDURE — 99284 EMERGENCY DEPT VISIT MOD MDM: CPT | Mod: 25

## 2025-04-04 NOTE — ED PROVIDER NOTE - OBJECTIVE STATEMENT
60 year old female presenting after mechanical fall several days PTA. Fell down several steps, unclear head trauma. Went to [facility], had clavicle XRAY that showed fx, sent by  here. Endorsing dull ha with bruising to R side of chest. No abd pain, no back pain, no numbness, weakness, tingling. No sob. No other acute complaints. ROS as above.

## 2025-04-04 NOTE — ED ADULT TRIAGE NOTE - BP NONINVASIVE SYSTOLIC (MM HG)
"Chief Complaint   Patient presents with     Chronic Disease Management       Initial /60 (BP Location: Left arm, Patient Position: Sitting, Cuff Size: Adult Large)   Pulse 76   Temp 96.9  F (36.1  C) (Tympanic)   Resp 20   Wt 104.4 kg (230 lb 1.6 oz)   SpO2 94%   BMI 38.29 kg/m   Estimated body mass index is 38.29 kg/m  as calculated from the following:    Height as of 11/23/21: 1.651 m (5' 5\").    Weight as of this encounter: 104.4 kg (230 lb 1.6 oz).  Medication Reconciliation: complete  Arlene Ortiz LPN  "
115

## 2025-04-04 NOTE — ED PROVIDER NOTE - CLINICAL SUMMARY MEDICAL DECISION MAKING FREE TEXT BOX
59 yo with clavicle fx, bruising to chest, also ha, will obtain ct head, ct chest, offered analgesia, pt declined. pt nvi.

## 2025-04-04 NOTE — ED PROVIDER NOTE - NS ED ROS FT
Constitutional: No fever or chills  Eyes: No discharge or drainage  Ears, Nose, Mouth, Throat: No nasal discharge, no sore throat  Cardiovascular: No chest pain, no palpitations  Respiratory: No shortness of breath, no cough  Gastrointestinal: No nausea or vomiting, no abdominal pain, no diarrhea or constipation  Musculoskeletal: No joint pain, no swelling  Skin: + bruising  Neurological: No numbness, weakness, tingling, no headache  Psychiatric: No depression

## 2025-04-04 NOTE — ED ADULT NURSE NOTE - NSFALLUNIVINTERV_ED_ALL_ED
Yes Bed/Stretcher in lowest position, wheels locked, appropriate side rails in place/Call bell, personal items and telephone in reach/Instruct patient to call for assistance before getting out of bed/chair/stretcher/Non-slip footwear applied when patient is off stretcher/[place] to call system/Physically safe environment - no spills, clutter or unnecessary equipment/Purposeful proactive rounding/Room/bathroom lighting operational, light cord in reach

## 2025-04-04 NOTE — ED PROVIDER NOTE - CARE PROVIDER_API CALL
Tomi Christensen  Orthopaedic Surgery  [address]  [place] [address]  Phone: [phone]  Fax: [phone]  Follow Up Time: 1-3 Days

## 2025-04-04 NOTE — ED PROVIDER NOTE - PHYSICAL EXAMINATION
general: Well appearing, in no acute distress  HEENT: Normocephalic, atraumatic, extraocular movements intact  CV: Regular rate  Pulm: No respiratory distress, no tachypnea, ctab, no wrr  Abd: Flat, no gross distension, soft, ntnd, no r/g  Ext: warm and well perfused  Skin: No gross rashes or lesions, R chest bruising  Neuro: Alert and oriented, moving all extremities

## 2025-04-04 NOTE — ED ADULT NURSE NOTE - OBJECTIVE STATEMENT
pt reports falling down the stairs this past saturday 10-12 steps, denies LOC, was complaining of pain and bruising to right shoulder, went to urgent care today and was told she had a broken clavicle and obtain CT of head

## 2025-04-04 NOTE — ED ADULT NURSE NOTE - CHPI ED NUR SYMPTOMS NEG
bruising to right clavicle/no abrasion/no back pain/no difficulty bearing weight/no fever/no numbness/no stiffness/no tingling/no weakness

## 2025-04-04 NOTE — ED PROVIDER NOTE - NSFOLLOWUPINSTRUCTIONS_ED_ALL_ED_FT
Clavicle Fracture    WHAT YOU NEED TO KNOW:    What is a clavicle fracture? A clavicle fracture is a break in your clavicle (collarbone).  Clavicle Fracture    What are the signs and symptoms of clavicle fracture?    Pain at the clavicle or top of the shoulder, especially with shoulder movement    Trouble moving your shoulder or arm    Swelling or bruising    Weakness, numbness, or tingling in your shoulder and arm    Lump or bulge in the fractured area    Deformed clavicle, or clavicle that looks out of place    Shoulder slumps down and forward    Support of your arm with the other hand to decrease pain  How is a clavicle fracture diagnosed? Your healthcare provider will ask about your injury and symptoms. He or she will also examine your shoulder and may press gently on the collarbone. Your provider may also check the feeling and strength in your arm, hand, and fingers. An x-ray or CT may show the fracture. You may be given contrast liquid to help the fracture show up better in the pictures. Tell the healthcare provider if you have ever had an allergic reaction to contrast liquid.    How is a clavicle fracture treated? Treatment will depend on the damage and the kind of fracture you have. Most broken clavicles heal on their own. It is very important to keep your arm from moving to allow the clavicle to heal. You may need any of the following:    Acetaminophen decreases pain and fever. It is available without a doctor's order. Ask how much to take and how often to take it. Follow directions. Read the labels of all other medicines you are using to see if they also contain acetaminophen, or ask your doctor or pharmacist. Acetaminophen can cause liver damage if not taken correctly.    NSAIDs, such as ibuprofen, help decrease swelling, pain, and fever. This medicine is available with or without a doctor's order. NSAIDs can cause stomach bleeding or kidney problems in certain people. If you take blood thinner medicine, always ask your healthcare provider if NSAIDs are safe for you. Always read the medicine label and follow directions.    A sling or brace will be recommended to keep your clavicle from moving so it can heal. It will also help decrease pain.  Shoulder Sling      Surgery may be needed to return the bones to their normal position. Pins, plates, and screws may be used to hold the bone together.  What can I do to manage a clavicle fracture?    Rest as needed to help your clavicle heal. Limit your activity as directed.    Apply ice on your clavicle for 15 to 20 minutes every hour or as directed. Use an ice pack, or put crushed ice in a plastic bag. Cover the bag with a towel before you apply it to your clavicle. Ice decreases swelling and pain.    Physical therapy may be recommended after your clavicle heals. A physical therapist teaches you exercises to help improve movement and strength, and to decrease pain.  When should I seek immediate care?    Your shoulder, arm, hand, or fingers turn blue or pale, or feel cold or numb.    Your pain gets worse, even after rest and medicine.    Your splint feels tight, or you have increased swelling.    You cannot move your fingers.  When should I call my doctor?    Your sling or wrap comes off or gets damaged.    You have questions or concerns about your condition or care.  CARE AGREEMENT:    You have the right to help plan your care. Learn about your health condition and how it may be treated. Discuss treatment options with your healthcare providers to decide what care you want to receive. You always have the right to refuse treatment.

## 2025-04-04 NOTE — ED ADULT TRIAGE NOTE - CHIEF COMPLAINT QUOTE
PT sent into ED by  for collarbone fracture . Pt slipped down the stairs on Saturday and injured her collarbone. Pt denies LOC, but unsure if she struck her head. Pt endorses some intermittent headache since fall, but denies any other symptoms. Pt denies blood thinner use.

## 2025-04-04 NOTE — ED PROVIDER NOTE - PATIENT PORTAL LINK FT
You can access the FollowMyHealth Patient Portal offered by [facility] by registering at the following website: [email or web address]. By joining [employer]’s FollowMyHealth portal, you will also be able to view your health information using other applications (apps) compatible with our system.

## 2025-04-04 NOTE — ED ADULT NURSE NOTE - CHIEF COMPLAINT QUOTE
Show Lcl Units: No PT sent into ED by  for collarbone fracture . Pt slipped down the stairs on Saturday and injured her collarbone. Pt denies LOC, but unsure if she struck her head. Pt endorses some intermittent headache since fall, but denies any other symptoms. Pt denies blood thinner use.

## 2025-04-07 DIAGNOSIS — Z91.09 OTHER ALLERGY STATUS, OTHER THAN TO DRUGS AND BIOLOGICAL SUBSTANCES: ICD-10-CM

## 2025-04-07 DIAGNOSIS — R51.9 HEADACHE, UNSPECIFIED: ICD-10-CM
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
## 2025-04-07 DIAGNOSIS — Z88.1 ALLERGY STATUS TO OTHER ANTIBIOTIC AGENTS: ICD-10-CM

## 2025-04-07 DIAGNOSIS — S20.211A CONTUSION OF RIGHT FRONT WALL OF THORAX, INITIAL ENCOUNTER: ICD-10-CM

## 2025-04-07 DIAGNOSIS — S09.90XA UNSPECIFIED INJURY OF HEAD, INITIAL ENCOUNTER: ICD-10-CM

## 2025-04-10 ENCOUNTER — APPOINTMENT (OUTPATIENT)
Dept: ORTHOPEDIC SURGERY | Facility: CLINIC | Age: 60
End: 2025-04-10
Payer: MEDICAID

## 2025-04-10 PROCEDURE — 99203 OFFICE O/P NEW LOW 30 MIN: CPT

## 2025-04-11 ENCOUNTER — LABORATORY RESULT (OUTPATIENT)
Age: 60
End: 2025-04-11

## 2025-04-11 ENCOUNTER — APPOINTMENT (OUTPATIENT)
Dept: INTERNAL MEDICINE | Facility: CLINIC | Age: 60
End: 2025-04-11
Payer: MEDICAID

## 2025-04-11 VITALS
OXYGEN SATURATION: 96 % | HEIGHT: 65 IN | SYSTOLIC BLOOD PRESSURE: 103 MMHG | WEIGHT: 133 LBS | HEART RATE: 75 BPM | TEMPERATURE: 97.4 F | DIASTOLIC BLOOD PRESSURE: 69 MMHG | BODY MASS INDEX: 22.16 KG/M2

## 2025-04-11 DIAGNOSIS — S42.001A FRACTURE OF UNSPECIFIED PART OF RIGHT CLAVICLE, INITIAL ENCOUNTER FOR CLOSED FRACTURE: ICD-10-CM

## 2025-04-11 DIAGNOSIS — Z01.818 ENCOUNTER FOR OTHER PREPROCEDURAL EXAMINATION: ICD-10-CM

## 2025-04-11 DIAGNOSIS — J45.909 UNSPECIFIED ASTHMA, UNCOMPLICATED: ICD-10-CM

## 2025-04-11 PROCEDURE — 93000 ELECTROCARDIOGRAM COMPLETE: CPT

## 2025-04-11 PROCEDURE — 36415 COLL VENOUS BLD VENIPUNCTURE: CPT

## 2025-04-11 PROCEDURE — 99214 OFFICE O/P EST MOD 30 MIN: CPT | Mod: 25,GC

## 2025-04-14 ENCOUNTER — NON-APPOINTMENT (OUTPATIENT)
Age: 60
End: 2025-04-14

## 2025-04-14 LAB
ALBUMIN SERPL ELPH-MCNC: 4.7 G/DL
ALP BLD-CCNC: 80 U/L
ALT SERPL-CCNC: 14 U/L
ANION GAP SERPL CALC-SCNC: 15 MMOL/L
APPEARANCE: CLEAR
APTT BLD: 30.7 SEC
AST SERPL-CCNC: 21 U/L
BILIRUB SERPL-MCNC: 0.2 MG/DL
BILIRUBIN URINE: NEGATIVE
BLOOD URINE: NEGATIVE
BUN SERPL-MCNC: 20 MG/DL
CALCIUM SERPL-MCNC: 9.8 MG/DL
CHLORIDE SERPL-SCNC: 100 MMOL/L
CO2 SERPL-SCNC: 25 MMOL/L
COLOR: YELLOW
CREAT SERPL-MCNC: 0.62 MG/DL
EGFRCR SERPLBLD CKD-EPI 2021: 101 ML/MIN/1.73M2
GLUCOSE QUALITATIVE U: NEGATIVE MG/DL
GLUCOSE SERPL-MCNC: 99 MG/DL
HCT VFR BLD CALC: 35.7 %
HGB BLD-MCNC: 11.7 G/DL
INR PPP: 0.83 RATIO
KETONES URINE: NEGATIVE MG/DL
LEUKOCYTE ESTERASE URINE: ABNORMAL
MCHC RBC-ENTMCNC: 31.8 PG
MCHC RBC-ENTMCNC: 32.8 G/DL
MCV RBC AUTO: 97 FL
NITRITE URINE: NEGATIVE
PH URINE: 6.5
PLATELET # BLD AUTO: 239 K/UL
POTASSIUM SERPL-SCNC: 4 MMOL/L
PROT SERPL-MCNC: 6.9 G/DL
PROTEIN URINE: NEGATIVE MG/DL
PT BLD: 9.9 SEC
RBC # BLD: 3.68 M/UL
RBC # FLD: 13.8 %
SODIUM SERPL-SCNC: 140 MMOL/L
SPECIFIC GRAVITY URINE: 1.01
TSH SERPL-ACNC: 4.13 UIU/ML
UROBILINOGEN URINE: 0.2 MG/DL
WBC # FLD AUTO: 8.46 K/UL

## 2025-04-15 ENCOUNTER — NON-APPOINTMENT (OUTPATIENT)
Age: 60
End: 2025-04-15

## 2025-04-16 NOTE — ASU PATIENT PROFILE, ADULT - FALL HARM RISK - RISK INTERVENTIONS

## 2025-04-16 NOTE — ASU PATIENT PROFILE, ADULT - NS PREOP UNDERSTANDS INFO
NPO/NO solid foods after  12MN,  water , apple juice till  6-8 am   dress  comfortable, no  lotions, no jewelry,  Bring ID photo  and insurance cards,  escort arranged, address and telephone given/yes

## 2025-04-16 NOTE — ASU PATIENT PROFILE, ADULT - NSICDXPASTMEDICALHX_GEN_ALL_CORE_FT
PAST MEDICAL HISTORY:  Asthma well comtrolled    H/O chronic sinusitis      PAST MEDICAL HISTORY:  Anxiety     Asthma well comtrolled    H/O chronic sinusitis

## 2025-04-17 ENCOUNTER — OUTPATIENT (OUTPATIENT)
Dept: OUTPATIENT SERVICES | Facility: HOSPITAL | Age: 60
LOS: 1 days | Discharge: HOME | End: 2025-04-17
Payer: MEDICAID

## 2025-04-17 ENCOUNTER — TRANSCRIPTION ENCOUNTER (OUTPATIENT)
Age: 60
End: 2025-04-17

## 2025-04-17 ENCOUNTER — APPOINTMENT (OUTPATIENT)
Dept: ORTHOPEDIC SURGERY | Facility: HOSPITAL | Age: 60
End: 2025-04-17

## 2025-04-17 VITALS
OXYGEN SATURATION: 97 % | RESPIRATION RATE: 16 BRPM | WEIGHT: 132.72 LBS | HEART RATE: 75 BPM | SYSTOLIC BLOOD PRESSURE: 103 MMHG | TEMPERATURE: 100 F | HEIGHT: 65 IN | DIASTOLIC BLOOD PRESSURE: 68 MMHG

## 2025-04-17 VITALS
RESPIRATION RATE: 12 BRPM | OXYGEN SATURATION: 95 % | HEART RATE: 70 BPM | DIASTOLIC BLOOD PRESSURE: 68 MMHG | SYSTOLIC BLOOD PRESSURE: 121 MMHG

## 2025-04-17 DIAGNOSIS — Z98.890 OTHER SPECIFIED POSTPROCEDURAL STATES: Chronic | ICD-10-CM

## 2025-04-17 PROCEDURE — 23515 OPTX CLAVICULAR FX W/INT FIX: CPT | Mod: RT

## 2025-04-17 DEVICE — IMPLANTABLE DEVICE: Type: IMPLANTABLE DEVICE | Site: RIGHT | Status: FUNCTIONAL

## 2025-04-17 RX ORDER — DUPILUMAB 200 MG/1.14ML
0 INJECTION, SOLUTION SUBCUTANEOUS
Refills: 0 | DISCHARGE

## 2025-04-17 RX ORDER — APREPITANT 40 MG/1
40 CAPSULE ORAL ONCE
Refills: 0 | Status: COMPLETED | OUTPATIENT
Start: 2025-04-17 | End: 2025-04-17

## 2025-04-17 RX ORDER — SODIUM CHLORIDE 9 G/1000ML
500 INJECTION, SOLUTION INTRAVENOUS
Refills: 0 | Status: DISCONTINUED | OUTPATIENT
Start: 2025-04-17 | End: 2025-04-17

## 2025-04-17 RX ORDER — ACETAMINOPHEN 500 MG/5ML
1000 LIQUID (ML) ORAL ONCE
Refills: 0 | Status: COMPLETED | OUTPATIENT
Start: 2025-04-17 | End: 2025-04-17

## 2025-04-17 RX ORDER — OXYCODONE 5 MG/1
5 TABLET ORAL EVERY 4 HOURS
Qty: 42 | Refills: 0 | Status: ACTIVE | COMMUNITY
Start: 2025-04-17 | End: 1900-01-01

## 2025-04-17 RX ORDER — ONDANSETRON HCL/PF 4 MG/2 ML
4 VIAL (ML) INJECTION ONCE
Refills: 0 | Status: DISCONTINUED | OUTPATIENT
Start: 2025-04-17 | End: 2025-04-17

## 2025-04-17 RX ORDER — FENTANYL CITRATE-0.9 % NACL/PF 100MCG/2ML
25 SYRINGE (ML) INTRAVENOUS
Refills: 0 | Status: DISCONTINUED | OUTPATIENT
Start: 2025-04-17 | End: 2025-04-17

## 2025-04-17 RX ADMIN — Medication 25 MICROGRAM(S): at 18:30

## 2025-04-17 RX ADMIN — Medication 1000 MILLIGRAM(S): at 13:23

## 2025-04-17 RX ADMIN — Medication 400 MILLIGRAM(S): at 19:12

## 2025-04-17 RX ADMIN — APREPITANT 40 MILLIGRAM(S): 40 CAPSULE ORAL at 13:26

## 2025-04-17 RX ADMIN — Medication 1 APPLICATION(S): at 13:26

## 2025-04-17 RX ADMIN — Medication 25 MICROGRAM(S): at 18:35

## 2025-04-17 NOTE — ASU DISCHARGE PLAN (ADULT/PEDIATRIC) - FINANCIAL ASSISTANCE
[facility] provides services at a reduced cost to those who are determined to be eligible through [facility]’s financial assistance program. Information regarding [facility]’s financial assistance program can be found by going to [email or web address] or by calling [phone]. awaiting consult

## 2025-04-17 NOTE — ASU DISCHARGE PLAN (ADULT/PEDIATRIC) - CARE PROVIDER_API CALL
Ron Henning Sudhakar  Spine Surgery  [address] [place] [address], [address]  [place] [address]  Phone: [phone]  Fax: [phone]  Follow Up Time: 2 weeks

## 2025-04-28 PROBLEM — F41.9 ANXIETY DISORDER, UNSPECIFIED: Chronic | Status: ACTIVE | Noted: 2025-04-17

## 2025-04-29 ENCOUNTER — NON-APPOINTMENT (OUTPATIENT)
Age: 60
End: 2025-04-29

## 2025-05-01 ENCOUNTER — APPOINTMENT (OUTPATIENT)
Dept: ORTHOPEDIC SURGERY | Facility: CLINIC | Age: 60
End: 2025-05-01
Payer: MEDICAID

## 2025-05-01 ENCOUNTER — OUTPATIENT (OUTPATIENT)
Dept: OUTPATIENT SERVICES | Facility: HOSPITAL | Age: 60
LOS: 1 days | End: 2025-05-01
Payer: MEDICAID

## 2025-05-01 ENCOUNTER — RESULT REVIEW (OUTPATIENT)
Age: 60
End: 2025-05-01

## 2025-05-01 DIAGNOSIS — S42.001A FRACTURE OF UNSPECIFIED PART OF RIGHT CLAVICLE, INITIAL ENCOUNTER FOR CLOSED FRACTURE: ICD-10-CM

## 2025-05-01 DIAGNOSIS — Z98.890 OTHER SPECIFIED POSTPROCEDURAL STATES: Chronic | ICD-10-CM

## 2025-05-01 PROCEDURE — 73000 X-RAY EXAM OF COLLAR BONE: CPT | Mod: 26,RT

## 2025-05-01 PROCEDURE — 73000 X-RAY EXAM OF COLLAR BONE: CPT

## 2025-05-01 PROCEDURE — 99024 POSTOP FOLLOW-UP VISIT: CPT

## 2025-05-15 ENCOUNTER — APPOINTMENT (OUTPATIENT)
Dept: OTOLARYNGOLOGY | Facility: CLINIC | Age: 60
End: 2025-05-15
Payer: MEDICAID

## 2025-05-15 VITALS — WEIGHT: 130 LBS | BODY MASS INDEX: 21.66 KG/M2 | HEIGHT: 65 IN

## 2025-05-15 DIAGNOSIS — J32.4 CHRONIC PANSINUSITIS: ICD-10-CM

## 2025-05-15 DIAGNOSIS — J33.9 NASAL POLYP, UNSPECIFIED: ICD-10-CM

## 2025-05-15 PROCEDURE — 31231 NASAL ENDOSCOPY DX: CPT

## 2025-05-15 PROCEDURE — 99213 OFFICE O/P EST LOW 20 MIN: CPT | Mod: 25

## 2025-06-05 ENCOUNTER — RESULT REVIEW (OUTPATIENT)
Age: 60
End: 2025-06-05

## 2025-06-05 ENCOUNTER — APPOINTMENT (OUTPATIENT)
Dept: ORTHOPEDIC SURGERY | Facility: CLINIC | Age: 60
End: 2025-06-05
Payer: MEDICAID

## 2025-06-05 ENCOUNTER — OUTPATIENT (OUTPATIENT)
Dept: OUTPATIENT SERVICES | Facility: HOSPITAL | Age: 60
LOS: 1 days | End: 2025-06-05
Payer: SELF-PAY

## 2025-06-05 DIAGNOSIS — Z98.890 OTHER SPECIFIED POSTPROCEDURAL STATES: Chronic | ICD-10-CM

## 2025-06-05 PROBLEM — S42.009D: Status: ACTIVE | Noted: 2025-06-05

## 2025-06-05 PROCEDURE — 73000 X-RAY EXAM OF COLLAR BONE: CPT | Mod: 26,RT

## 2025-06-05 PROCEDURE — 73000 X-RAY EXAM OF COLLAR BONE: CPT

## 2025-06-05 PROCEDURE — 99024 POSTOP FOLLOW-UP VISIT: CPT

## 2025-07-17 ENCOUNTER — RESULT REVIEW (OUTPATIENT)
Age: 60
End: 2025-07-17

## 2025-07-17 ENCOUNTER — OUTPATIENT (OUTPATIENT)
Dept: OUTPATIENT SERVICES | Facility: HOSPITAL | Age: 60
LOS: 1 days | End: 2025-07-17
Payer: MEDICAID

## 2025-07-17 ENCOUNTER — APPOINTMENT (OUTPATIENT)
Dept: ORTHOPEDIC SURGERY | Facility: CLINIC | Age: 60
End: 2025-07-17
Payer: MEDICAID

## 2025-07-17 DIAGNOSIS — Z98.890 OTHER SPECIFIED POSTPROCEDURAL STATES: Chronic | ICD-10-CM

## 2025-07-17 PROCEDURE — 99213 OFFICE O/P EST LOW 20 MIN: CPT

## 2025-07-17 PROCEDURE — 73000 X-RAY EXAM OF COLLAR BONE: CPT | Mod: 26,RT

## 2025-07-18 PROBLEM — S42.021D CLOSED DISPLACED FRACTURE OF SHAFT OF RIGHT CLAVICLE WITH ROUTINE HEALING, SUBSEQUENT ENCOUNTER: Status: ACTIVE | Noted: 2025-07-18

## 2025-07-28 ENCOUNTER — RX RENEWAL (OUTPATIENT)
Age: 60
End: 2025-07-28

## 2025-09-11 ENCOUNTER — APPOINTMENT (OUTPATIENT)
Dept: OTOLARYNGOLOGY | Facility: CLINIC | Age: 60
End: 2025-09-11
Payer: MEDICAID

## 2025-09-11 DIAGNOSIS — J32.4 CHRONIC PANSINUSITIS: ICD-10-CM

## 2025-09-11 DIAGNOSIS — J33.9 NASAL POLYP, UNSPECIFIED: ICD-10-CM

## 2025-09-11 PROCEDURE — 92557 COMPREHENSIVE HEARING TEST: CPT

## 2025-09-11 PROCEDURE — 99213 OFFICE O/P EST LOW 20 MIN: CPT | Mod: 25

## 2025-09-11 PROCEDURE — 92550 TYMPANOMETRY & REFLEX THRESH: CPT | Mod: 52

## 2025-09-11 PROCEDURE — 31231 NASAL ENDOSCOPY DX: CPT

## 2025-09-15 ENCOUNTER — RX RENEWAL (OUTPATIENT)
Age: 60
End: 2025-09-15

## (undated) DEVICE — SUT PROLENE 4-0 36" RB-1

## (undated) DEVICE — VENODYNE/SCD SLEEVE CALF MEDIUM

## (undated) DEVICE — BUR MEDTRONIC ENT TAPERED DIAMOND CHOANAL ATRESIA 15 DEGREE 4MM X 13CM

## (undated) DEVICE — ACCLARENT SET INFLATION DEVICE

## (undated) DEVICE — Device

## (undated) DEVICE — POSITIONER FOAM EGG CRATE ULNAR 2PCS (PINK)

## (undated) DEVICE — PACK ORTHO ELBOW HAND

## (undated) DEVICE — SNARE CAPTIVATOR II RND COLD 10MM

## (undated) DEVICE — GLV 7 PROTEXIS (WHITE)

## (undated) DEVICE — ELCTR BOVIE SUCTION 8FR 6"

## (undated) DEVICE — PETRI DISH MED 3.5"

## (undated) DEVICE — OVERDRILL FOR 2.4MM SCR

## (undated) DEVICE — SYR ASEPTO

## (undated) DEVICE — DRAPE C ARM 41X140"

## (undated) DEVICE — SUT PLAIN GUT 4-0 18" SC-1

## (undated) DEVICE — BLADE MEDTRONIC ENT RAD 60 DEGREE ROTATABLE 4MM X 11CM

## (undated) DEVICE — DRAPE C ARM MINI

## (undated) DEVICE — SUT CHROMIC 4-0 18" G-3

## (undated) DEVICE — BLADE MEDTRONIC ENT RAD 90 DEGREE ROTATABLE 3.5MM X 11CM

## (undated) DEVICE — POLY TRAP ETRAP

## (undated) DEVICE — SYR LUER LOK 50CC

## (undated) DEVICE — DRILL BIT S&N LONG 1.8MM

## (undated) DEVICE — BLADE MEDTRONIC ENT INFERIOR TURBINATE ROTATABLE STRAIGHT 2.9MM X 11CM

## (undated) DEVICE — DRSG TELFA 3 X 8

## (undated) DEVICE — DRAPE MAYO STAND 23"

## (undated) DEVICE — SOL ANTI FOG

## (undated) DEVICE — SUT PROLENE 2-0 30" CT-2

## (undated) DEVICE — SLV COMPRESSION KNEE MED

## (undated) DEVICE — WARMING BLANKET LOWER ADULT

## (undated) DEVICE — SLING ARM CHIEFTAIN MESH LARGE

## (undated) DEVICE — DRSG GAUZE SPONGE 2X2" STERILE

## (undated) DEVICE — SUCTION CATH ARGYLE WHISTLE TIP 14FR STRAIGHT PACKED

## (undated) DEVICE — MARKING PEN W RULER

## (undated) DEVICE — DRSG STERISTRIPS 0.5 X 4"

## (undated) DEVICE — DRSG MEROCEL SPLINT SILICONE

## (undated) DEVICE — ELCTR BOVIE PENCIL BLADE 10FT

## (undated) DEVICE — PACK RHINOPLASTY

## (undated) DEVICE — MEDTRONIC AXIEM PATIENT TRACKER NON-INVASIVE

## (undated) DEVICE — SUT ETHILON 4-0 18" FS-2

## (undated) DEVICE — BLADE MEDTRONIC ENT FUSION QUADCUT ROTATABLE STRAIGHT 4.3MM X 13CM

## (undated) DEVICE — STAPLER SKIN VISI-STAT 35 WIDE

## (undated) DEVICE — MEDTRONIC INSTRUMENT TRACKER ENT

## (undated) DEVICE — FORCEP RADIAL JAW 4 240CM DISP

## (undated) DEVICE — FORCEP RADIAL JAW 4 W NDL 2.2MM 2.8MM 240CM ORANGE DISP

## (undated) DEVICE — GLV 7.5 PROTEXIS (WHITE)